# Patient Record
Sex: MALE | Race: WHITE
[De-identification: names, ages, dates, MRNs, and addresses within clinical notes are randomized per-mention and may not be internally consistent; named-entity substitution may affect disease eponyms.]

---

## 2017-01-05 ENCOUNTER — HOSPITAL ENCOUNTER (OUTPATIENT)
Dept: HOSPITAL 53 - M OUTALCOH | Age: 32
End: 2017-01-05
Attending: PSYCHIATRY & NEUROLOGY
Payer: MEDICAID

## 2017-01-05 DIAGNOSIS — Z13.9: Primary | ICD-10-CM

## 2017-01-05 DIAGNOSIS — F12.20: ICD-10-CM

## 2017-01-05 DIAGNOSIS — F14.20: ICD-10-CM

## 2017-01-06 ENCOUNTER — HOSPITAL ENCOUNTER (EMERGENCY)
Dept: HOSPITAL 53 - M ED | Age: 32
Discharge: HOME | End: 2017-01-06
Payer: MEDICAID

## 2017-01-06 DIAGNOSIS — F19.10: ICD-10-CM

## 2017-01-06 DIAGNOSIS — F41.9: Primary | ICD-10-CM

## 2017-01-06 LAB
ALBUMIN SERPL BCG-MCNC: 4.1 GM/DL (ref 3.2–5.2)
ALBUMIN/GLOB SERPL: 1.52 {RATIO} (ref 1–1.93)
ALP SERPL-CCNC: 57 U/L (ref 45–117)
ALT SERPL W P-5'-P-CCNC: 21 U/L (ref 12–78)
AMPHETAMINES UR QL SCN: NEGATIVE
ANION GAP SERPL CALC-SCNC: 8 MEQ/L (ref 8–16)
AST SERPL-CCNC: 14 U/L (ref 15–37)
BENZODIAZ UR QL SCN: NEGATIVE
BILIRUB CONJ SERPL-MCNC: 0.2 MG/DL (ref 0–0.2)
BILIRUB SERPL-MCNC: 0.8 MG/DL (ref 0.2–1)
BUN SERPL-MCNC: 14 MG/DL (ref 7–18)
BZE UR QL SCN: POSITIVE
CALCIUM SERPL-MCNC: 8.9 MG/DL (ref 8.5–10.1)
CHLORIDE SERPL-SCNC: 105 MEQ/L (ref 98–107)
CO2 SERPL-SCNC: 29 MEQ/L (ref 21–32)
CONTROL LINE: (no result)
CREAT SERPL-MCNC: 1.28 MG/DL (ref 0.7–1.3)
ERYTHROCYTE [DISTWIDTH] IN BLOOD BY AUTOMATED COUNT: 12.4 % (ref 11.5–14.5)
GFR SERPL CREATININE-BSD FRML MDRD: > 60 ML/MIN/{1.73_M2} (ref 60–?)
GLUCOSE SERPL-MCNC: 98 MG/DL (ref 70–105)
MCH RBC QN AUTO: 30.3 PG (ref 27–33)
MCHC RBC AUTO-ENTMCNC: 34.4 G/DL (ref 32–36.5)
MCV RBC AUTO: 88.1 FL (ref 80–96)
METHADONE UR QL SCN: NEGATIVE
OPIATES UR QL SCN: NEGATIVE
PLATELET # BLD AUTO: 249 K/MM3 (ref 150–450)
POTASSIUM SERPL-SCNC: 4.5 MEQ/L (ref 3.5–5.1)
PROT SERPL-MCNC: 6.8 GM/DL (ref 6.4–8.2)
SODIUM SERPL-SCNC: 142 MEQ/L (ref 136–145)
TRICYCLIC ANTIDEPRESS URINE: NEGATIVE
WBC # BLD AUTO: 4.3 K/MM3 (ref 4–10)

## 2017-01-06 PROCEDURE — 85027 COMPLETE CBC AUTOMATED: CPT

## 2017-01-06 PROCEDURE — 80076 HEPATIC FUNCTION PANEL: CPT

## 2017-01-06 PROCEDURE — 80306 DRUG TEST PRSMV INSTRMNT: CPT

## 2017-01-06 PROCEDURE — 36415 COLL VENOUS BLD VENIPUNCTURE: CPT

## 2017-01-06 PROCEDURE — 80048 BASIC METABOLIC PNL TOTAL CA: CPT

## 2017-01-06 PROCEDURE — 99284 EMERGENCY DEPT VISIT MOD MDM: CPT

## 2017-01-06 PROCEDURE — 84443 ASSAY THYROID STIM HORMONE: CPT

## 2017-01-06 NOTE — EDDOCDS
Nurse's Notes                                                                                     

[facility]                                                                         

Name: Lorenzo Kowalski                                                                                

Age: 31 yrs                                                                                       

Sex: Male                                                                                         

: 1985                                                                                   

MRN: [id number]                                                                                     

Arrival Date: 2017                                                                          

Time: 10:36                                                                                       

Account#: [id number]                                                                              

Bed [facility]                                                                                          

Private MD: No Pcp                                                                                

Diagnosis: Anxiety disorder, unspecified                                                          

                                                                                                  

Presentation:                                                                                     

                                                                                             

10:38 Presenting complaint: Patient states: states that he has a bed at [facility] for rehab    ml6 

      for crack cocaine abuse, states that he is anxious and afraid he is going to use before     

      then. Patient denies SI or HI. Presenting complaint:. Mental Health Triage Level: Level     

      1- Pt displays no suicidal or homicidal ideations and does not appear to be a danger to     

      self or others. Adult Sepsis Screening: The patient does not have new or worsening          

      altered mentation. Patient's respiratory rate is less than 22. Systolic blood pressure      

      is greater than 100. Patient has a qSOFA score of 0- Negative Sepsis Screen.                

      Suicide/Homicide risk assessment- the patient denies having any suicidal and/or             

      homicidal ideations and does not present with any other emotional, behavioral or mental     

      health complaints.  Status: Patient is not a  or              

      dependent. Transition of care: patient was not received from another setting of care.       

      Red Flag criteria, patient assessed and taken directly to a bed.                            

10:38 Acuity: PEARL Level 4                                                                     ml6 

10:38 Method Of Arrival: Walkin/Carried/Asstd                                                 ml6 

                                                                                                  

Triage Assessment:                                                                                

10:53 General: Appears in no apparent distress, Behavior is appropriate for age, cooperative. ml6 

      Pain: Denies pain. HIV screening NA for this visit Offered previously. Neurological: No     

      deficits noted. Level of Consciousness is awake, alert, Oriented to person, place,          

      time,  are equal bilaterally Moves all extremities. Full function. Cardiovascular:     

      Capillary refill < 3 seconds is brisk in bilateral fingers toes Heart tones S1 S2           

      present. Respiratory: No deficits noted. Airway is patent Respiratory effort is even,       

      unlabored, Respiratory pattern is regular, symmetrical. GI: No deficits noted.              

                                                                                                  

Historical:                                                                                       

- Allergies: no known allergies;                                                                  

- Home Meds:                                                                                      

1. amoxicillin 500 mg Oral cap 1 cap every 8 hours                                              

     (Last dose: 2017 07:00)                                                                

2. Motrin 800 mg Oral tab 1 tab 3 times per day                                                 

     (Last dose: 2017 07:00)                                                                

- PMHx: Substance Abuse;                                                                          

- PSHx: none;                                                                                     

- Social history: Smoking status: Patient states was never smoker of tobacco. Patient             

uses street drugs, marijuana, cocaine, No barriers to communication noted, Speaks               

appropriately for age.                                                                          

- Family history: Not pertinent.                                                                  

- : The pt / caregiver states he / she is not on anticoagulants. Home medication list             

is obtained from.                                                                               

- Exposure Risk Screening:: None identified.                                                      

                                                                                                  

                                                                                                  

Screenin:27 Screening information is obtained from the patient. Fall risk: No risks identified.     jo3 

      Assistance ADL's: requires no assistance with activities of daily living. Abuse/DV          

      Screen: The patient / caregiver reports he/she is: not in a situation that causes fear,     

      pain or injury. Nutritional screening: No deficits noted. Advance Directives: There is      

      no active DNR order. home support is adequate.                                              

                                                                                                  

Assessment:                                                                                       

11:31 General: Appears in no apparent distress, Behavior is anxious, cooperative, pleasant.   jo3 

      General: Pt denies SI/HI. States that he uses "a lot" of crack cocaine on a daily basis     

      and has not used for 2 days. Has a bed at treatment facility on Tuesday . Neurological:     

      Level of Consciousness is awake, alert, Oriented to person, place, time.                    

      Cardiovascular: No deficits noted. Respiratory: Airway is patent Respiratory effort is      

      even, unlabored. Derm: Skin is pink, warm & dry.                                          

12:25 Reassessment: Patient appears in no apparent distress at this time. Patient denies pain jo3 

      at this time. Security observing. Mother at bedside .                                       

13:27 General: Appears in no apparent distress, comfortable, Behavior is anxious,             jo3 

      cooperative, pleasant. General: Security observing. Neurological: No deficits noted.        

      Respiratory: No deficits noted. Airway is patent Respiratory effort is even, unlabored.     

                                                                                                  

Mental Health Eval:                                                                               

12:29 Mental health consult is initiated at 11:30.  Status: The patient is not a        

       or  dependent. [facility]: The patient is established     

      as a patient of [facility]. Referral Information: Evaluation referral is         

      generated by the patient himself / herself. The patient was referred for evaluation         

      because Pt states he is scheduled to go to rehab for methamphetamine use on Tuesday at      

      [facility]. Pt states it was recommended that he receive a "psychological evaluation" for        

      his anxiety prior to his admission. Subjective: The patients chief complaint is I           

      started having panic attacks after stopping crack. I am going to [facility] on           

      Tuesday, but I was told to come here. I'm not suicidal or homicidal, I'm just having a      

      lot of anxiety.                                                                             

12:57 Disposition: Medically cleared for disposition by Vick Steinberg MD Psychiatric Consult   ac  

      is performed by phone with Dr Gaurav Whelan.                                          

                                                                                                  

Vital Signs:                                                                                      

10:38  / 70; Pulse 56; Resp 18; Temp 98.9; Pulse Ox 100% ; Weight 81.65 kg; Height 6    elp 

      ft. 3 in. (190.50 cm); Pain 0/10;                                                           

13:52  / 88; Pulse 58; Resp 16; Temp 98.7(TE); Pulse Ox 98% on R/A;                     jo3 

10:38 Body Mass Index 22.50 (81.65 kg, 190.50 cm)                                             elp 

                                                                                                  

Vitals:                                                                                           

10:38 Log In Time: 2017 at 10:36. RN notified that patient meets Red Flag         elp 

      criteria.                                                                                   

                                                                                                  

ED Course:                                                                                        

10:37 Patient visited by Deisy Strong PCA.                                                  elp 

10:37 No Pcp is Private Physician.                                                            elp 

10:37 Patient moved to Waiting                                                                elp 

10:38 Patient visited by Deisy Strong PCA.                                                  elp 

10:40 Patient moved to [facility]                                                                   ml6 

10:44 Vick Steinberg MD is Attending Physician.                                               br1 

10:50 Triage Initiated                                                                        ml6 

10:56 Patient visited by Clifford Almanza.                                                      dpm 

11:02 Patient visited by Vick Steinberg MD.                                                   br1 

11:06 Pt greeted and oriented to ED. Patient advised of names of staff involved in care,      dpm 

      location of call bell, wait times and NPO status. Patient has correct armband on for        

      positive identification. Bed in low position. Side rails up X 1. Security observing.        

      Property left with pt per RNJl. PT mental health triage doesn't require the pt     

      to change. Psych Safety Check: Location: Psych Room. Visual Assessment: Cooperative.        

11:20 Patient visited by Clifford Almanza.                                                      dpm 

11:25 Labs drawn. (by ED staff). Sent per order to lab.                                       jo3 

11:28 Acetaminophen Level Sent.                                                               jo3 

11:28 Basic Metabolic Profile Sent.                                                           jo3 

11:28 Complete Blood Count Sent.                                                              jo3 

11:28 Drug Eval Toxicology ED Only Sent.                                                      jo3 

11:28 Ethyl Alcohol (ethanol) Sent.                                                           jo3 

11:28 Liver Profile Sent.                                                                     jo3 

11:28 Salicylate Level Sent.                                                                  jo3 

11:28 Thyroid Stimulating Hormone Sent.                                                       jo3 

11:30 Patient visited by Clifford Almanza.                                                      dpm 

11:32 Patient visited by Sinai Latif,BATSHEVA.                                                jo3 

11:34 Patient name changed from Lorenzo\S\\S\Dex\S\ to Lorenzo\S\Raimundo\S\Dex.                   
   EDMS

11:50 Patient visited by Clifford Almanza.                                                      dpm 

12:00 [facility] Payment Agreement was scanned into [employer] and attached to record.               pm4 

12:07 Patient visited by Mina Calvillo RN.                                                   ml6 

12:28 Patient visited by Clifford Almanza.                                                      dpm 

12:29 Placed in psych safe attire.  requested the pt to be changed. Property         dpm 

      removed, inventory done, secured in belongings bag- placed in locked locker. Placed in      

      locker 3.                                                                                   

12:45 Patient visited by Clifford Almanza.                                                      dpm 

13:27 The patient / caregiver is instructed regarding the plan of care and ED course.         jo3 

13:27 No IV's were initiated during this patient's visit.                                     jo3 

13:28 Patient visited by Sinai Latif RN.                                                jo3 

13:31 [facility] is Referral Physician.                                      br1 

13:52 No procedures done that require assistance.                                             jo3 

                                                                                                  

Order Results:                                                                                    

Lab Order: Acetaminophen Level; SPEC'M 17 11:24                                             

      Test: ACETAMINOPHEN LEVEL; Value: < 2.0; Range: 10.0-30.0; Abnormal: Below low normal;      

      Units: UG/ML; Status: F                                                                     

Lab Order: Basic Metabolic Profile; SPEC'M 17 11:24                                         

      Test: GLUCOSE, FASTING; Value: 98; Range: ; Units: MG/DL; Status: F                   

      Test: BLOOD UREA NITROGEN; Value: 14; Range: 7-18; Units: MG/DL; Status: F                  

      Test: CREATININE FOR GFR; Value: 1.28; Range: 0.70-1.30; Units: MG/DL; Status: F            

      Test: GLOMERULAR FILTRATION RATE; Value: > 60.0; Range: >60; Status: F                      

      Test: SODIUM LEVEL; Value: 142; Range: 136-145; Units: MEQ/L; Status: F                     

      Test: POTASSIUM SERUM; Value: 4.5; Range: 3.5-5.1; Units: MEQ/L; Status: F                  

      Test: CHLORIDE LEVEL; Value: 105; Range: ; Units: MEQ/L; Status: F                    

      Test: CARBON DIOXIDE LEVEL; Value: 29; Range: 21-32; Units: MEQ/L; Status: F                

      Test: ANION GAP; Value: 8; Range: 8-16; Units: MEQ/L; Status: F                             

      Test: CALCIUM LEVEL; Value: 8.9; Range: 8.5-10.1; Units: MG/DL; Status: F                   

      Test Note: &nbsp;; Units are mL/min/1.73 m2 Chronic Kidney Disease Staging per NKF:       

      Stage I & II GFR >=60 Normal to Mildly Decreased Stage III GFR 30-59 Moderately           

      Decreased Stage IV GFR 15-29 Severely Decreased Stage V GFR <15 Very Little GFR Left        

      ESRD GFR <15 on RRT                                                                         

Lab Order: Complete Blood Count; SPEC'M 17 11:24                                            

      Test: WHITE BLOOD COUNT; Value: 4.3; Range: 4.0-10.0; Units: K/mm3; Status: F               

      Test: RED BLOOD COUNT; Value: 5.55; Range: 4.30-6.10; Units: M/mm3; Status: F               

      Test: HEMOGLOBIN; Value: 16.8; Range: 14.0-18.0; Units: g/dl; Status: F                     

      Test: HEMATOCRIT; Value: 48.9; Range: 42.0-52.0; Units: %; Status: F                        

      Test: MEAN CORPUSCULAR VOLUME; Value: 88.1; Range: 80.0-96.0; Units: fl; Status: F          

      Test: MEAN CORPUSCULAR HEMOGLOBIN; Value: 30.3; Range: 27.0-33.0; Units: pg; Status: F      

      Test: MEAN CORPUSCULAR HGB CONC; Value: 34.4; Range: 32.0-36.5; Units: g/dl; Status: F      

      Test: RED CELL DISTRIBUTION WIDTH; Value: 12.4; Range: 11.5-14.5; Units: %; Status: F       

      Test: PLATELET COUNT, AUTOMATED; Value: 249; Range: 150-450; Units: k/mm3; Status: F        

Lab Order: Drug Eval Toxicology ED Only; SPEC'M 17 11:24                                    

      Test: AMPHETAMINES LEVEL URINE; Value: NEGATIVE; Range: NEGATIVE; Status: F                 

      Test: BARBITURATES URINE; Value: NEGATIVE; Range: NEGATIVE; Status: F                       

      Test: BENZODIAZEPINES URINE; Value: NEGATIVE; Range: NEGATIVE; Status: F                    

      Test: CANNABINOIDS URINE; Value: POSITIVE; Range: NEGATIVE; Abnormal: Above high            

      normal; Status: F                                                                           

      Test: COCAINE METABOLITE URINE; Value: POSITIVE; Range: NEGATIVE; Abnormal: Above high      

      normal; Status: F                                                                           

      Test: METHADONE URINE; Value: NEGATIVE; Range: NEGATIVE; Status: F                          

      Test: OPIATES URINE; Value: NEGATIVE; Range: NEGATIVE; Status: F                            

      Test: TRICYCLIC ANTIDEPRESS URINE; Value: NEGATIVE; Range: NEGATIVE; Status: F              

      Test Note: &nbsp;; FALSE POSITIVE RESULTS CAN BE CAUSED BY THE USE OF PANTOPRAZOLE        

      (PROTONIX).                                                                                 

Lab Order: Ethyl Alcohol (ethanol); SPEC'M 17 11:24                                         

      Test: ETHYL ALCOHOL (ETHANOL); Value: 0.004; Range: 0.000-0.010; Units: %; Status: F        

Lab Order: Liver Profile; SPEC'M 17 11:24                                                   

      Test: AST/SGOT; Value: 14; Range: 15-37; Abnormal: Below low normal; Units: U/L;            

      Status: F                                                                                   

      Test: ALT/SGPT; Value: 21; Range: 12-78; Units: U/L; Status: F                              

      Test: ALKALINE PHOSPHATASE; Value: 57; Range: ; Units: U/L; Status: F                 

      Test: BILIRUBIN,TOTAL; Value: 0.8; Range: 0.2-1.0; Units: MG/DL; Status: F                  

      Test: BILIRUBIN,DIRECT; Value: 0.2; Range: 0.0-0.2; Units: MG/DL; Status: F                 

      Test: TOTAL PROTEIN; Value: 6.8; Range: 6.4-8.2; Units: GM/DL; Status: F                    

      Test: ALBUMIN; Value: 4.1; Range: 3.2-5.2; Units: GM/DL; Status: F                          

      Test: ALBUMIN/GLOBULIN RATIO; Value: 1.52; Range: 1.00-1.93; Status: F                      

Lab Order: Salicylate Level; SPEC'M 17 11:24                                                

      Test: SALICYLATE LEVEL; Value: < 1.7; Range: 5.0-30.0; Abnormal: Below low normal;          

      Units: MG/DL; Status: F                                                                     

Lab Order: Thyroid Stimulating Hormone; SPEC'M 17 11:24                                     

      Test: THYROID STIMULATING HORMONE; Value: 0.736; Range: 0.358-3.740; Units: uIU/ML;         

      Status: F                                                                                   

                                                                                                  

Outcome:                                                                                          

13:32 Discharge ordered by Provider.                                                          br1 

13:52 Discharge Assessment: Patient awake, alert and oriented x 3. No cognitive and/or        jo3 

      functional deficits noted. Patient verbalized understanding of disposition                  

      instructions. patient administered narcotics - no. The following High Risk Discharge        

      criteria are identified: None. Discharged to home ambulatory, with parent. Condition:       

      stable. No special radiology studies were completed.                                        

13:57 Patient left the ED.                                                                    jo3 

                                                                                                  

Signatures:                                                                                       

Dispatcher [employer]                           EDMS                                                 

Denzel Morris, PSA                       PSA  ac                                                   

Sinai Latif RN                    RN   jo3                                                  

Vick Steinberg MD MD   br1                                                  

Mina Calvillo RN                       RN   ml6                                                  

Clifford Almanza                               dpDeisy Sadler, KENNETH                      PCA  elTom Loera, Reg                     Reg  pm4                                                  

                                                                                                  

Corrections: (The following items were deleted from the chart)                                    

13:34 12:29 Subjective: The patients chief complaint is I started having panic attacks after  ac  

      stopping meth. I am going to [facility] on Tuesday, but I was told to come here. I'm     

      not suicidal or homicidal, I'm just having a lot of anxiety. ac                             

                                                                                                  

**************************************************************************************************

MTDD

## 2017-01-06 NOTE — EDDOCDS
Physician Documentation                                                                           

[facility]                                                                         

Name: Lorenzo Kowalski                                                                                

Age: 31 yrs                                                                                       

Sex: Male                                                                                         

: 1985                                                                                   

MRN: [id number]                                                                                     

Arrival Date: 2017                                                                          

Time: 10:36                                                                                       

Account#: [id number]                                                                              

Bed U3                                                                                          

Private MD: No Pcp                                                                                

Disposition:                                                                                      

17 13:32 Discharged to Home/Self Care. Impression: Anxiety disorder, unspecified.           

- Condition is Stable.                                                                            

- Discharge Instructions: Depression, Adult, Generalized Anxiety Disorder.                        

                                                                                                  

- Medication Reconciliation, Local Pharmacy Hours form.                                           

- Follow up: [facility]; When: 4 - 5 days; Reason: Recheck today's               

complaints.                                                                                     

- Problem is new.                                                                                 

- Symptoms have improved.                                                                         

- Notes: You were seen in the ED for a mental health evaluation with concerns for                 

anxiety. Bloodwork showed no acute findings, drug screen was positive for cocaine and           

marijuana. Psychiatry was consulted and recommended you may return home to be seen              

for outpatient follow-up. Please call as directed by your psychosocial assesor and              

arrange to be seen. Return to the ED for any worsening depression, thoughts of                  

harming self or others or any other concerns.                                                   

                                                                                                  

                                                                                                  

Historical:                                                                                       

- Allergies: no known allergies;                                                                  

- Home Meds:                                                                                      

1. amoxicillin 500 mg Oral cap 1 cap every 8 hours                                              

     (Last dose: 2017 07:00)                                                                

2. Motrin 800 mg Oral tab 1 tab 3 times per day                                                 

     (Last dose: 2017 07:00)                                                                

- PMHx: Substance Abuse;                                                                          

- PSHx: none;                                                                                     

- Social history: Smoking status: Patient states was never smoker of tobacco. Patient             

uses street drugs, marijuana, cocaine, No barriers to communication noted, Speaks               

appropriately for age.                                                                          

- Family history: Not pertinent.                                                                  

- : The pt / caregiver states he / she is not on anticoagulants. Home medication list             

is obtained from.                                                                               

- Exposure Risk Screening:: None identified.                                                      

                                                                                                  

                                                                                                  

Vital Signs:                                                                                      

                                                                                             

10:38  / 70; Pulse 56; Resp 18; Temp 98.9; Pulse Ox 100% ; Weight 81.65 kg / 180.01     elp 

      lbs; Height 6 ft. 3 in. (190.50 cm); Pain 0/10;                                             

13:52  / 88; Pulse 58; Resp 16; Temp 98.7(TE); Pulse Ox 98% on R/A;                     jo3 

10:38 Body Mass Index 22.50 (81.65 kg, 190.50 cm)                                             elp 

                                                                                                  

MDM:                                                                                              

11:03 Consult PFS/PSA/ ordered.                                                  br1 

11:03 Consult PFS/PSA/: Patient's case requires discussion with on-call          br1 

      Psychiatrist ordered.                                                                       

11:03 PSA/PFS to call Nursing Supervisor, to enter patient data on [place] Safe Act if patient    br1 

      involuntarily admitted or transferred for SI or HI ordered.                                 

11:03 Confirm accurate psychiatric medication list and times of last dosage ordered.          br1 

11:03 Detain Pt Until Medically/PFS Cleared ordered.                                          br1 

11:04 Acetaminophen Level Ordered.                                                            EDMS

11:04 Basic Metabolic Profile Ordered.                                                        EDMS

11:04 Complete Blood Count Ordered.                                                           EDMS

11:04 Drug Eval Toxicology ED Only Ordered.                                                   EDMS

11:04 Ethyl Alcohol (ethanol) Ordered.                                                        EDMS

11:04 Liver Profile Ordered.                                                                  EDMS

11:04 Salicylate Level Ordered.                                                               EDMS

11:04 Thyroid Stimulating Hormone Ordered.                                                    EDMS

11:23 Financial registration complete.                                                        pm4 

11:30 REGULAR DIET PLASTIC WARE+DIET ordered.                                                 EDMS

12:00 [facility] Payment Agreement was scanned into [employer] and attached to record.               pm4 

13:28 Acetaminophen Level Reviewed.                                                           br1 

13:28 Drug Eval Toxicology ED Only Reviewed.                                                  br1 

13:28 Liver Profile Reviewed.                                                                 br1 

13:28 Salicylate Level Reviewed.                                                              br1 

13:28 Basic Metabolic Profile Reviewed.                                                       br1 

13:28 Complete Blood Count Reviewed.                                                          br1 

13:28 Ethyl Alcohol (ethanol) Reviewed.                                                       br1 

13:28 Thyroid Stimulating Hormone Reviewed.                                                   br1 

13:29 Consult PFS/PSA/Socail Worker: Cleared medically for eval ordered.                      br1 

                                                                                                  

Signatures:                                                                                       

Dispatcher [employer]                           EDMS                                                 

Sinai LatifRN                    RN   jo3                                                  

Vick Steinberg MD MD   br1                                                  

Mina Calvillo RN                       RN   ml6                                                  

Tom Louis, Reg                     Reg  pm4                                                  

                                                                                                  

The chart was reviewed and I authenticate all verbal orders and agree with the evaluation and 
treatment provided.Attachments:

12:00 [facility] Payment Agreement                                                                pm4 

                                                                                                  

**************************************************************************************************

MTDD

## 2017-01-08 NOTE — EDDOCDS
Nurse's Notes                                                                                     

[facility]                                                                         

Name: Lorenzo Kowalski                                                                                

Age: 31 yrs                                                                                       

Sex: Male                                                                                         

: 1985                                                                                   

MRN: [id number]                                                                                     

Arrival Date: 2017                                                                          

Time: 10:36                                                                                       

Account#: [id number]                                                                              

Bed [facility]                                                                                          

Private MD: No Pcp                                                                                

Diagnosis: Anxiety disorder, unspecified                                                          

                                                                                                  

Presentation:                                                                                     

                                                                                             

10:38 Presenting complaint: Patient states: states that he has a bed at [facility] for rehab    ml6 

      for crack cocaine abuse, states that he is anxious and afraid he is going to use before     

      then. Patient denies SI or HI. Presenting complaint:. Mental Health Triage Level: Level     

      1- Pt displays no suicidal or homicidal ideations and does not appear to be a danger to     

      self or others. Adult Sepsis Screening: The patient does not have new or worsening          

      altered mentation. Patient's respiratory rate is less than 22. Systolic blood pressure      

      is greater than 100. Patient has a qSOFA score of 0- Negative Sepsis Screen.                

      Suicide/Homicide risk assessment- the patient denies having any suicidal and/or             

      homicidal ideations and does not present with any other emotional, behavioral or mental     

      health complaints.  Status: Patient is not a  or              

      dependent. Transition of care: patient was not received from another setting of care.       

      Red Flag criteria, patient assessed and taken directly to a bed.                            

10:38 Acuity: PEARL Level 4                                                                     ml6 

10:38 Method Of Arrival: Walkin/Carried/Asstd                                                 ml6 

                                                                                                  

Triage Assessment:                                                                                

10:53 General: Appears in no apparent distress, Behavior is appropriate for age, cooperative. ml6 

      Pain: Denies pain. HIV screening NA for this visit Offered previously. Neurological: No     

      deficits noted. Level of Consciousness is awake, alert, Oriented to person, place,          

      time,  are equal bilaterally Moves all extremities. Full function. Cardiovascular:     

      Capillary refill < 3 seconds is brisk in bilateral fingers toes Heart tones S1 S2           

      present. Respiratory: No deficits noted. Airway is patent Respiratory effort is even,       

      unlabored, Respiratory pattern is regular, symmetrical. GI: No deficits noted.              

                                                                                                  

Historical:                                                                                       

- Allergies: no known allergies;                                                                  

- Home Meds:                                                                                      

1. amoxicillin 500 mg Oral cap 1 cap every 8 hours                                              

     (Last dose: 2017 07:00)                                                                

2. Motrin 800 mg Oral tab 1 tab 3 times per day                                                 

     (Last dose: 2017 07:00)                                                                

- PMHx: Substance Abuse;                                                                          

- PSHx: none;                                                                                     

- Social history: Smoking status: Patient states was never smoker of tobacco. Patient             

uses street drugs, marijuana, cocaine, No barriers to communication noted, Speaks               

appropriately for age.                                                                          

- Family history: Not pertinent.                                                                  

- : The pt / caregiver states he / she is not on anticoagulants. Home medication list             

is obtained from.                                                                               

- Exposure Risk Screening:: None identified.                                                      

                                                                                                  

                                                                                                  

Screenin:27 Screening information is obtained from the patient. Fall risk: No risks identified.     jo3 

      Assistance ADL's: requires no assistance with activities of daily living. Abuse/DV          

      Screen: The patient / caregiver reports he/she is: not in a situation that causes fear,     

      pain or injury. Nutritional screening: No deficits noted. Advance Directives: There is      

      no active DNR order. home support is adequate.                                              

                                                                                                  

Assessment:                                                                                       

11:31 General: Appears in no apparent distress, Behavior is anxious, cooperative, pleasant.   jo3 

      General: Pt denies SI/HI. States that he uses "a lot" of crack cocaine on a daily basis     

      and has not used for 2 days. Has a bed at treatment facility on Tuesday . Neurological:     

      Level of Consciousness is awake, alert, Oriented to person, place, time.                    

      Cardiovascular: No deficits noted. Respiratory: Airway is patent Respiratory effort is      

      even, unlabored. Derm: Skin is pink, warm & dry.                                          

12:25 Reassessment: Patient appears in no apparent distress at this time. Patient denies pain jo3 

      at this time. Security observing. Mother at bedside .                                       

13:27 General: Appears in no apparent distress, comfortable, Behavior is anxious,             jo3 

      cooperative, pleasant. General: Security observing. Neurological: No deficits noted.        

      Respiratory: No deficits noted. Airway is patent Respiratory effort is even, unlabored.     

                                                                                                  

Mental Health Eval:                                                                               

12:29 Mental health consult is initiated at 11:30.  Status: The patient is not a        

       or  dependent. [facility]: The patient is established     

      as a patient of [facility]. Referral Information: Evaluation referral is         

      generated by the patient himself / herself. The patient was referred for evaluation         

      because Pt states he is scheduled to go to rehab for methamphetamine use on Tuesday at      

      [facility]. Pt states it was recommended that he receive a "psychological evaluation" for        

      his anxiety prior to his admission. Subjective: The patients chief complaint is I           

      started having panic attacks after stopping crack. I am going to [facility] on           

      Tuesday, but I was told to come here. I'm not suicidal or homicidal, I'm just having a      

      lot of anxiety.                                                                             

12:57 Disposition: Medically cleared for disposition by Vcik Steinberg MD Psychiatric Consult   ac  

      is performed by phone with Dr Gaurav Whelan.                                          

14:20 Mental Health history: no relevant mental health problems or treatments. Mental Health  ac  

      Admissions: None. Current Outpatient Mental Health Services: None. Current living           

      environment is The patient currently lives with his / her father, . Patient presents to     

      Emergency Department with the following symptoms within the past 2 weeks: anxiety, drug     

      abuse. Substance abuse: Patient uses cocaine, Patient uses marijuana. Mental status         

      exam: Patients appearance is appropriate, Patient's behavior is cooperative, Speech is      

      normal. Affect is appropriate. Mood is anxious. Hallucinations are denied. Appetite is      

      normal. Memory is good. Energy level is normal. Content of thought is normal. Thought       

      process is intact. Cognitive level is oriented to person, place, time and situation         

      Patient's insight is fair. Judgement is fair. Rapport with interviewer is good.             

      Suicidal Ideation is not present. Homicidal ideation is not present. [facility] Admission         

      Criteria: Not Applicable. DSM-V Differential Diagnosis: Social Communication Disorder.      

      Stimulant Use Disorder, Pt uses cocaine.                                                    

                                                                                                  

Vital Signs:                                                                                      

10:38  / 70; Pulse 56; Resp 18; Temp 98.9; Pulse Ox 100% ; Weight 81.65 kg; Height 6    elp 

      ft. 3 in. (190.50 cm); Pain 0/10;                                                           

13:52  / 88; Pulse 58; Resp 16; Temp 98.7(TE); Pulse Ox 98% on R/A;                     jo3 

10:38 Body Mass Index 22.50 (81.65 kg, 190.50 cm)                                             elp 

                                                                                                  

Vitals:                                                                                           

10:38 Log In Time: 2017 at 10:36. RN notified that patient meets Red Flag         elp 

      criteria.                                                                                   

                                                                                                  

ED Course:                                                                                        

10:37 Patient visited by Deisy Strong PCA.                                                  elp 

10:37 No Pcp is Private Physician.                                                            elp 

10:37 Patient moved to Waiting                                                                elp 

10:38 Patient visited by Deisy Strong PCA.                                                  elp 

10:40 Patient moved to [facility]                                                                   ml6 

10:44 Vick Steinberg MD is Attending Physician.                                               br1 

10:50 Triage Initiated                                                                        ml6 

10:56 Patient visited by Clifford Almanza.                                                      dpm 

11:02 Patient visited by Vick Steinberg MD.                                                   br1 

11:06 Pt greeted and oriented to ED. Patient advised of names of staff involved in care,      dpm 

      location of call bell, wait times and NPO status. Patient has correct armband on for        

      positive identification. Bed in low position. Side rails up X 1. Security observing.        

      Property left with pt per RN, Jl Calvillo. PT mental health triage doesn't require the pt     

      to change. Psych Safety Check: Location: Psych Room. Visual Assessment: Cooperative.        

11:20 Patient visited by Clifford Almanza.                                                      dpm 

11:25 Labs drawn. (by ED staff). Sent per order to lab.                                       jo3 

11:28 Acetaminophen Level Sent.                                                               jo3 

11:28 Basic Metabolic Profile Sent.                                                           jo3 

11:28 Complete Blood Count Sent.                                                              jo3 

11:28 Drug Eval Toxicology ED Only Sent.                                                      jo3 

11:28 Ethyl Alcohol (ethanol) Sent.                                                           jo3 

11:28 Liver Profile Sent.                                                                     jo3 

11:28 Salicylate Level Sent.                                                                  jo3 

11:28 Thyroid Stimulating Hormone Sent.                                                       jo3 

11:30 Patient visited by Clifford Almanza.                                                      dpm 

11:32 Patient visited by Sinai Latif RN.                                                jo3 

11:34 Patient name changed from Lorenzo\S\\S\Cook\S\ to Lorenzo\S\Raimundo\S\Dex.                   
   EDMS

11:50 Patient visited by Clifford Almanza.                                                      dpm 

12:00 [facility] Payment Agreement was scanned into [employer] and attached to record.               pm4 

12:07 Patient visited by Mina Calvillo RN.                                                   ml6 

12:28 Patient visited by Clifford Almanza.                                                      dpm 

12:29 Placed in psych safe attire.  requested the pt to be changed. Property         dpm 

      removed, inventory done, secured in belongings bag- placed in locked locker. Placed in      

      locker 3.                                                                                   

12:45 Patient visited by Clifford Almanza.                                                      dpm 

13:27 The patient / caregiver is instructed regarding the plan of care and ED course.         jo3 

13:27 No IV's were initiated during this patient's visit.                                     jo3 

13:28 Patient visited by Sinai Latif RN.                                                jo3 

13:31 [facility] is Referral Physician.                                      br1 

13:52 No procedures done that require assistance.                                             jo3 

                                                                                             

09:08 T-Sheet-- Draft Copy was scanned into [employer] and attached to record.                   gb  

                                                                                                  

Order Results:                                                                                    

Lab Order: Acetaminophen Level; SPEC'M 17 11:24                                             

      Test: ACETAMINOPHEN LEVEL; Value: < 2.0; Range: 10.0-30.0; Abnormal: Below low normal;      

      Units: UG/ML; Status: F                                                                     

Lab Order: Basic Metabolic Profile; SPEC'M 17 11:24                                         

      Test: GLUCOSE, FASTING; Value: 98; Range: ; Units: MG/DL; Status: F                   

      Test: BLOOD UREA NITROGEN; Value: 14; Range: 7-18; Units: MG/DL; Status: F                  

      Test: CREATININE FOR GFR; Value: 1.28; Range: 0.70-1.30; Units: MG/DL; Status: F            

      Test: GLOMERULAR FILTRATION RATE; Value: > 60.0; Range: >60; Status: F                      

      Test: SODIUM LEVEL; Value: 142; Range: 136-145; Units: MEQ/L; Status: F                     

      Test: POTASSIUM SERUM; Value: 4.5; Range: 3.5-5.1; Units: MEQ/L; Status: F                  

      Test: CHLORIDE LEVEL; Value: 105; Range: ; Units: MEQ/L; Status: F                    

      Test: CARBON DIOXIDE LEVEL; Value: 29; Range: 21-32; Units: MEQ/L; Status: F                

      Test: ANION GAP; Value: 8; Range: 8-16; Units: MEQ/L; Status: F                             

      Test: CALCIUM LEVEL; Value: 8.9; Range: 8.5-10.1; Units: MG/DL; Status: F                   

      Test Note: &nbsp;; Units are mL/min/1.73 m2 Chronic Kidney Disease Staging per NKF:       

      Stage I & II GFR >=60 Normal to Mildly Decreased Stage III GFR 30-59 Moderately           

      Decreased Stage IV GFR 15-29 Severely Decreased Stage V GFR <15 Very Little GFR Left        

      ESRD GFR <15 on RRT                                                                         

Lab Order: Complete Blood Count; SPEC'M 17 11:24                                            

      Test: WHITE BLOOD COUNT; Value: 4.3; Range: 4.0-10.0; Units: K/mm3; Status: F               

      Test: RED BLOOD COUNT; Value: 5.55; Range: 4.30-6.10; Units: M/mm3; Status: F               

      Test: HEMOGLOBIN; Value: 16.8; Range: 14.0-18.0; Units: g/dl; Status: F                     

      Test: HEMATOCRIT; Value: 48.9; Range: 42.0-52.0; Units: %; Status: F                        

      Test: MEAN CORPUSCULAR VOLUME; Value: 88.1; Range: 80.0-96.0; Units: fl; Status: F          

      Test: MEAN CORPUSCULAR HEMOGLOBIN; Value: 30.3; Range: 27.0-33.0; Units: pg; Status: F      

      Test: MEAN CORPUSCULAR HGB CONC; Value: 34.4; Range: 32.0-36.5; Units: g/dl; Status: F      

      Test: RED CELL DISTRIBUTION WIDTH; Value: 12.4; Range: 11.5-14.5; Units: %; Status: F       

      Test: PLATELET COUNT, AUTOMATED; Value: 249; Range: 150-450; Units: k/mm3; Status: F        

Lab Order: Drug Eval Toxicology ED Only; SPEC'M 17 11:24                                    

      Test: AMPHETAMINES LEVEL URINE; Value: NEGATIVE; Range: NEGATIVE; Status: F                 

      Test: BARBITURATES URINE; Value: NEGATIVE; Range: NEGATIVE; Status: F                       

      Test: BENZODIAZEPINES URINE; Value: NEGATIVE; Range: NEGATIVE; Status: F                    

      Test: CANNABINOIDS URINE; Value: POSITIVE; Range: NEGATIVE; Abnormal: Above high            

      normal; Status: F                                                                           

      Test: COCAINE METABOLITE URINE; Value: POSITIVE; Range: NEGATIVE; Abnormal: Above high      

      normal; Status: F                                                                           

      Test: METHADONE URINE; Value: NEGATIVE; Range: NEGATIVE; Status: F                          

      Test: OPIATES URINE; Value: NEGATIVE; Range: NEGATIVE; Status: F                            

      Test: TRICYCLIC ANTIDEPRESS URINE; Value: NEGATIVE; Range: NEGATIVE; Status: F              

      Test Note: &nbsp;; FALSE POSITIVE RESULTS CAN BE CAUSED BY THE USE OF PANTOPRAZOLE        

      (PROTONIX).                                                                                 

Lab Order: Ethyl Alcohol (ethanol); SPEC'M 17 11:24                                         

      Test: ETHYL ALCOHOL (ETHANOL); Value: 0.004; Range: 0.000-0.010; Units: %; Status: F        

Lab Order: Liver Profile; SPEC'M 17 11:24                                                   

      Test: AST/SGOT; Value: 14; Range: 15-37; Abnormal: Below low normal; Units: U/L;            

      Status: F                                                                                   

      Test: ALT/SGPT; Value: 21; Range: 12-78; Units: U/L; Status: F                              

      Test: ALKALINE PHOSPHATASE; Value: 57; Range: ; Units: U/L; Status: F                 

      Test: BILIRUBIN,TOTAL; Value: 0.8; Range: 0.2-1.0; Units: MG/DL; Status: F                  

      Test: BILIRUBIN,DIRECT; Value: 0.2; Range: 0.0-0.2; Units: MG/DL; Status: F                 

      Test: TOTAL PROTEIN; Value: 6.8; Range: 6.4-8.2; Units: GM/DL; Status: F                    

      Test: ALBUMIN; Value: 4.1; Range: 3.2-5.2; Units: GM/DL; Status: F                          

      Test: ALBUMIN/GLOBULIN RATIO; Value: 1.52; Range: 1.00-1.93; Status: F                      

Lab Order: Salicylate Level; SPEC'M 17 11:24                                                

      Test: SALICYLATE LEVEL; Value: < 1.7; Range: 5.0-30.0; Abnormal: Below low normal;          

      Units: MG/DL; Status: F                                                                     

Lab Order: Thyroid Stimulating Hormone; SPEC'M 17 11:24                                     

      Test: THYROID STIMULATING HORMONE; Value: 0.736; Range: 0.358-3.740; Units: uIU/ML;         

      Status: F                                                                                   

                                                                                                  

Outcome:                                                                                          

                                                                                             

13:32 Discharge ordered by Provider.                                                          br1 

13:52 Discharge Assessment: Patient awake, alert and oriented x 3. No cognitive and/or        jo3 

      functional deficits noted. Patient verbalized understanding of disposition                  

      instructions. patient administered narcotics - no. The following High Risk Discharge        

      criteria are identified: None. Discharged to home ambulatory, with parent. Condition:       

      stable. No special radiology studies were completed.                                        

13:57 Patient left the ED.                                                                    jo3 

                                                                                                  

Signatures:                                                                                       

Dispatcher [employer]                           EDMS                                                 

Denzel Morris, CHERYL                       PSA  Marysol Jeong, Sinai Dietz RN                    RN   jo3                                                  

Vick Steinberg MD MD   br1                                                  

Mina Calvillo RN                       RN   ml6                                                  

Clifford Almanza                               dpm                                                  

Deisy Strong, PCA                      PCA  elp                                                  

Tom Louis, Reg                     Reg  pm4                                                  

                                                                                                  

Corrections: (The following items were deleted from the chart)                                    

13:34 12:29 Subjective: The patients chief complaint is I started having panic attacks after  ac  

      stopping meth. I am going to [facility] on Tuesday, but I was told to come here. I'm     

      not suicidal or homicidal, I'm just having a lot of anxiety. ac                             

                                                                                                  

**************************************************************************************************



*** Chart Complete ***
MTDD

## 2017-01-08 NOTE — EDDOCDS
Physician Documentation                                                                           

[facility]                                                                         

Name: Lorenzo Kowalski                                                                                

Age: 31 yrs                                                                                       

Sex: Male                                                                                         

: 1985                                                                                   

MRN: [id number]                                                                                     

Arrival Date: 2017                                                                          

Time: 10:36                                                                                       

Account#: [id number]                                                                              

Bed U3                                                                                          

Private MD: No Pcp                                                                                

Disposition:                                                                                      

17 13:32 Discharged to Home/Self Care. Impression: Anxiety disorder, unspecified.           

- Condition is Stable.                                                                            

- Discharge Instructions: Depression, Adult, Generalized Anxiety Disorder.                        

                                                                                                  

- Medication Reconciliation, Local Pharmacy Hours form.                                           

- Follow up: [facility]; When: 4 - 5 days; Reason: Recheck today's               

complaints.                                                                                     

- Problem is new.                                                                                 

- Symptoms have improved.                                                                         

- Notes: You were seen in the ED for a mental health evaluation with concerns for                 

anxiety. Bloodwork showed no acute findings, drug screen was positive for cocaine and           

marijuana. Psychiatry was consulted and recommended you may return home to be seen              

for outpatient follow-up. Please call as directed by your psychosocial assesor and              

arrange to be seen. Return to the ED for any worsening depression, thoughts of                  

harming self or others or any other concerns.                                                   

                                                                                                  

                                                                                                  

Historical:                                                                                       

- Allergies: no known allergies;                                                                  

- Home Meds:                                                                                      

1. amoxicillin 500 mg Oral cap 1 cap every 8 hours                                              

     (Last dose: 2017 07:00)                                                                

2. Motrin 800 mg Oral tab 1 tab 3 times per day                                                 

     (Last dose: 2017 07:00)                                                                

- PMHx: Substance Abuse;                                                                          

- PSHx: none;                                                                                     

- Social history: Smoking status: Patient states was never smoker of tobacco. Patient             

uses street drugs, marijuana, cocaine, No barriers to communication noted, Speaks               

appropriately for age.                                                                          

- Family history: Not pertinent.                                                                  

- : The pt / caregiver states he / she is not on anticoagulants. Home medication list             

is obtained from.                                                                               

- Exposure Risk Screening:: None identified.                                                      

                                                                                                  

                                                                                                  

Vital Signs:                                                                                      

                                                                                             

10:38  / 70; Pulse 56; Resp 18; Temp 98.9; Pulse Ox 100% ; Weight 81.65 kg / 180.01     elp 

      lbs; Height 6 ft. 3 in. (190.50 cm); Pain 0/10;                                             

13:52  / 88; Pulse 58; Resp 16; Temp 98.7(TE); Pulse Ox 98% on R/A;                     jo3 

10:38 Body Mass Index 22.50 (81.65 kg, 190.50 cm)                                             elp 

                                                                                                  

MDM:                                                                                              

11:03 Consult PFS/PSA/ ordered.                                                  br1 

11:03 Consult PFS/PSA/: Patient's case requires discussion with on-call          br1 

      Psychiatrist ordered.                                                                       

11:03 PSA/PFS to call Nursing Supervisor, to enter patient data on [place] Safe Act if patient    br1 

      involuntarily admitted or transferred for SI or HI ordered.                                 

11:03 Confirm accurate psychiatric medication list and times of last dosage ordered.          br1 

11:03 Detain Pt Until Medically/PFS Cleared ordered.                                          br1 

11:04 Acetaminophen Level Ordered.                                                            EDMS

11:04 Basic Metabolic Profile Ordered.                                                        EDMS

11:04 Complete Blood Count Ordered.                                                           EDMS

11:04 Drug Eval Toxicology ED Only Ordered.                                                   EDMS

11:04 Ethyl Alcohol (ethanol) Ordered.                                                        EDMS

11:04 Liver Profile Ordered.                                                                  EDMS

11:04 Salicylate Level Ordered.                                                               EDMS

11:04 Thyroid Stimulating Hormone Ordered.                                                    EDMS

11:23 Financial registration complete.                                                        pm4 

11:30 REGULAR DIET PLASTIC WARE+DIET ordered.                                                 EDMS

12:00 [facility] Payment Agreement was scanned into [employer] and attached to record.               pm4 

13:28 Acetaminophen Level Reviewed.                                                           br1 

13:28 Drug Eval Toxicology ED Only Reviewed.                                                  br1 

13:28 Liver Profile Reviewed.                                                                 br1 

13:28 Salicylate Level Reviewed.                                                              br1 

13:28 Basic Metabolic Profile Reviewed.                                                       br1 

13:28 Complete Blood Count Reviewed.                                                          br1 

13:28 Ethyl Alcohol (ethanol) Reviewed.                                                       br1 

13:28 Thyroid Stimulating Hormone Reviewed.                                                   br1 

13:29 Consult PFS/PSA/Socail Worker: Cleared medically for eval ordered.                      br1 

                                                                                             

09:08 T-Sheet-- Draft Copy was scanned into [employer] and attached to record.                   gb  

                                                                                                  

Signatures:                                                                                       

Dispatcher [employer]                           EDMS                                                 

Marysol Maxwell, Reg                  Reg  gb                                                   

Sinai Latif,RN                    RN   jo3                                                  

Vick Steinberg MD MD   br1                                                  

Mina Calvillo, RN                       RN   ml6                                                  

Tom Louis, Reg                     Reg  pm4                                                  

                                                                                                  

The chart was reviewed and I authenticate all verbal orders and agree with the evaluation and 
treatment provided.Attachments:

                                                                                             

12:00 [facility] Payment Agreement                                                                pm4 

                                                                                             

09:08 T-Sheet-- Draft Copy                                                                    gb  

                                                                                                  

**************************************************************************************************



*** Chart Complete ***
MTDD

## 2017-01-08 NOTE — EDDOCDS
Physician Documentation                                                                           

[facility]                                                                         

Name: Lorenzo Kowalski                                                                                

Age: 31 yrs                                                                                       

Sex: Male                                                                                         

: 1985                                                                                   

MRN: [id number]                                                                                     

Arrival Date: 2017                                                                          

Time: 10:36                                                                                       

Account#: [id number]                                                                              

Bed U3                                                                                          

Private MD: No Pcp                                                                                

Disposition:                                                                                      

17 13:32 Discharged to Home/Self Care. Impression: Anxiety disorder, unspecified.           

- Condition is Stable.                                                                            

- Discharge Instructions: Depression, Adult, Generalized Anxiety Disorder.                        

                                                                                                  

- Medication Reconciliation, Local Pharmacy Hours form.                                           

- Follow up: [facility]; When: 4 - 5 days; Reason: Recheck today's               

complaints.                                                                                     

- Problem is new.                                                                                 

- Symptoms have improved.                                                                         

- Notes: You were seen in the ED for a mental health evaluation with concerns for                 

anxiety. Bloodwork showed no acute findings, drug screen was positive for cocaine and           

marijuana. Psychiatry was consulted and recommended you may return home to be seen              

for outpatient follow-up. Please call as directed by your psychosocial assesor and              

arrange to be seen. Return to the ED for any worsening depression, thoughts of                  

harming self or others or any other concerns.                                                   

                                                                                                  

                                                                                                  

Historical:                                                                                       

- Allergies: no known allergies;                                                                  

- Home Meds:                                                                                      

1. amoxicillin 500 mg Oral cap 1 cap every 8 hours                                              

     (Last dose: 2017 07:00)                                                                

2. Motrin 800 mg Oral tab 1 tab 3 times per day                                                 

     (Last dose: 2017 07:00)                                                                

- PMHx: Substance Abuse;                                                                          

- PSHx: none;                                                                                     

- Social history: Smoking status: Patient states was never smoker of tobacco. Patient             

uses street drugs, marijuana, cocaine, No barriers to communication noted, Speaks               

appropriately for age.                                                                          

- Family history: Not pertinent.                                                                  

- : The pt / caregiver states he / she is not on anticoagulants. Home medication list             

is obtained from.                                                                               

- Exposure Risk Screening:: None identified.                                                      

                                                                                                  

                                                                                                  

Vital Signs:                                                                                      

                                                                                             

10:38  / 70; Pulse 56; Resp 18; Temp 98.9; Pulse Ox 100% ; Weight 81.65 kg / 180.01     elp 

      lbs; Height 6 ft. 3 in. (190.50 cm); Pain 0/10;                                             

13:52  / 88; Pulse 58; Resp 16; Temp 98.7(TE); Pulse Ox 98% on R/A;                     jo3 

10:38 Body Mass Index 22.50 (81.65 kg, 190.50 cm)                                             elp 

                                                                                                  

MDM:                                                                                              

11:03 Consult PFS/PSA/ ordered.                                                  br1 

11:03 Consult PFS/PSA/: Patient's case requires discussion with on-call          br1 

      Psychiatrist ordered.                                                                       

11:03 PSA/PFS to call Nursing Supervisor, to [place] Safe [place] if patient    br1 

      involuntarily admitted or transferred for SI or HI ordered.                                 

11:03 Confirm accurate psychiatric medication list and times of last dosage ordered.          br1 

11:03 Detain Pt Until Medically/PFS Cleared ordered.                                          br1 

11:04 Acetaminophen Level Ordered.                                                            EDMS

11:04 Basic Metabolic Profile Ordered.                                                        EDMS

11:04 Complete Blood Count Ordered.                                                           EDMS

11:04 Drug Eval Toxicology ED Only Ordered.                                                   EDMS

11:04 Ethyl Alcohol (ethanol) Ordered.                                                        EDMS

11:04 Liver Profile Ordered.                                                                  EDMS

11:04 Salicylate Level Ordered.                                                               EDMS

11:04 Thyroid Stimulating Hormone Ordered.                                                    EDMS

11:23 Financial registration complete.                                                        pm4 

11:30 REGULAR DIET PLASTIC WARE+DIET ordered.                                                 EDMS

12:00 [facility] Payment Agreement was scanned into [employer] and attached to record.               pm4 

13:28 Acetaminophen Level Reviewed.                                                           br1 

13:28 Drug Eval Toxicology ED Only Reviewed.                                                  br1 

13:28 Liver Profile Reviewed.                                                                 br1 

13:28 Salicylate Level Reviewed.                                                              br1 

13:28 Basic Metabolic Profile Reviewed.                                                       br1 

13:28 Complete Blood Count Reviewed.                                                          br1 

13:28 Ethyl Alcohol (ethanol) Reviewed.                                                       br1 

13:28 Thyroid Stimulating Hormone Reviewed.                                                   br1 

13:29 Consult PFS/PSA/Socail Worker: Cleared medically for eval ordered.                      br1 

                                                                                             

09:08 T-Sheet-- Draft Copy was scanned into [employer] and attached to record.                   gb  

                                                                                                  

Signatures:                                                                                       

Dispatcher [employer]                           EDMS                                                 

Marysol Maxwell, Reg                  Reg  gb                                                   

Sinai Latif,RN                    RN   jo3                                                  

Vick Steinberg MD MD   br1                                                  

Mina Calvillo, RN                       RN   ml6                                                  

Tom Louis, Reg                     Reg  pm4                                                  

                                                                                                  

The chart was reviewed and I authenticate all verbal orders and agree with the evaluation and 
treatment provided.Attachments:

                                                                                             

12:00 [facility] Payment Agreement                                                                pm4 

                                                                                             

09:08 T-Sheet-- Draft Copy                                                                    gb  

                                                                                                  

**************************************************************************************************



*** Chart Complete ***
MTDD

## 2017-01-26 ENCOUNTER — HOSPITAL ENCOUNTER (INPATIENT)
Dept: HOSPITAL 53 - M ED | Age: 32
LOS: 4 days | Discharge: HOME | DRG: 751 | End: 2017-01-30
Attending: PSYCHIATRY & NEUROLOGY | Admitting: PSYCHIATRY & NEUROLOGY
Payer: MEDICAID

## 2017-01-26 VITALS — DIASTOLIC BLOOD PRESSURE: 65 MMHG | SYSTOLIC BLOOD PRESSURE: 122 MMHG

## 2017-01-26 VITALS — WEIGHT: 195.77 LBS | BODY MASS INDEX: 24.34 KG/M2 | HEIGHT: 75 IN

## 2017-01-26 VITALS — DIASTOLIC BLOOD PRESSURE: 74 MMHG | SYSTOLIC BLOOD PRESSURE: 128 MMHG

## 2017-01-26 DIAGNOSIS — M25.532: ICD-10-CM

## 2017-01-26 DIAGNOSIS — M54.5: ICD-10-CM

## 2017-01-26 DIAGNOSIS — F33.9: Primary | ICD-10-CM

## 2017-01-26 DIAGNOSIS — Z79.899: ICD-10-CM

## 2017-01-26 DIAGNOSIS — F14.10: ICD-10-CM

## 2017-01-26 LAB
ALBUMIN SERPL BCG-MCNC: 4 GM/DL (ref 3.2–5.2)
ALBUMIN/GLOB SERPL: 1.54 {RATIO} (ref 1–1.93)
ALP SERPL-CCNC: 54 U/L (ref 45–117)
ALT SERPL W P-5'-P-CCNC: 22 U/L (ref 12–78)
AMPHETAMINES UR QL SCN: NEGATIVE
ANION GAP SERPL CALC-SCNC: 9 MEQ/L (ref 8–16)
AST SERPL-CCNC: 26 U/L (ref 15–37)
BENZODIAZ UR QL SCN: NEGATIVE
BILIRUB CONJ SERPL-MCNC: 0.2 MG/DL (ref 0–0.2)
BILIRUB SERPL-MCNC: 0.7 MG/DL (ref 0.2–1)
BUN SERPL-MCNC: 14 MG/DL (ref 7–18)
BZE UR QL SCN: POSITIVE
CALCIUM SERPL-MCNC: 9 MG/DL (ref 8.5–10.1)
CHLORIDE SERPL-SCNC: 108 MEQ/L (ref 98–107)
CO2 SERPL-SCNC: 27 MEQ/L (ref 21–32)
CONTROL LINE: (no result)
CREAT SERPL-MCNC: 0.98 MG/DL (ref 0.7–1.3)
ERYTHROCYTE [DISTWIDTH] IN BLOOD BY AUTOMATED COUNT: 13.6 % (ref 11.5–14.5)
GFR SERPL CREATININE-BSD FRML MDRD: > 60 ML/MIN/{1.73_M2} (ref 60–?)
GLUCOSE SERPL-MCNC: 85 MG/DL (ref 70–105)
MCH RBC QN AUTO: 29.8 PG (ref 27–33)
MCHC RBC AUTO-ENTMCNC: 34.2 G/DL (ref 32–36.5)
MCV RBC AUTO: 87 FL (ref 80–96)
METHADONE UR QL SCN: NEGATIVE
OPIATES UR QL SCN: NEGATIVE
PLATELET # BLD AUTO: 198 K/MM3 (ref 150–450)
POTASSIUM SERPL-SCNC: 3.9 MEQ/L (ref 3.5–5.1)
PROT SERPL-MCNC: 6.6 GM/DL (ref 6.4–8.2)
SODIUM SERPL-SCNC: 144 MEQ/L (ref 136–145)
TRICYCLIC ANTIDEPRESS URINE: NEGATIVE
WBC # BLD AUTO: 5.5 K/MM3 (ref 4–10)

## 2017-01-26 NOTE — EDDOCDS
Physician Documentation                                                                           

[facility]                                                                         

Name: Lorenzo Kowalski                                                                                

Age: 31 yrs                                                                                       

Sex: Male                                                                                         

: 1985                                                                                   

MRN: [id number]                                                                                     

Arrival Date: 2017                                                                          

Time: 07:50                                                                                       

Account#: [id number]                                                                              

Bed D1                                                                                            

Private MD:                                                                                       

Disposition:                                                                                      

17 11:20 Hospitalization ordered by Anjel Loomis for Inpatient Admission. Preliminary     

diagnosis is Suicidal ideations.                                                                

- Bed requested for Admit.                                                                        

- Status is Inpatient Admission.                                                              mcp 

- Condition is Stable.                                                                            

- Problem is new.                                                                                 

- Symptoms are unchanged.                                                                         

                                                                                                  

                                                                                                  

                                                                                                  

Historical:                                                                                       

- Allergies: no known allergies;                                                                  

- Home Meds:                                                                                      

1. Paxil Unknown Oral Unknown once daily not taking                                             

2. trazodone Unknown Oral Unknown nightly                                                       

     (Last dose: 2017 21:00)                                                                

- PMHx: Substance Abuse; Anxiety; Depression; back pain;                                          

- PSHx: none;                                                                                     

- Social history: Smoking status: Patient states was never smoker of tobacco. Patient             

uses alcohol occasionally. street drugs, cocaine, No barriers to communication noted,           

Speaks appropriately for age.                                                                   

- Family history: Not pertinent.                                                                  

- : The pt / caregiver states he / she is not on anticoagulants. Home medication list             

is obtained from the patient.                                                                   

- Exposure Risk Screening:: None identified.                                                      

                                                                                                  

                                                                                                  

Vital Signs:                                                                                      

                                                                                             

08:06 Resp 16; Weight 81.65 kg / 180.01 lbs (R); Height 6 ft. 3 in. (190.50 cm) (R); Pain     ml6 

      0/10;                                                                                       

08:16  / 77; Pulse 58; Resp 16; Temp 97.7(O); Pulse Ox 94% on R/A;                      dpm 

11:48  / 77; Pulse 67; Resp 16; Temp 97.5(O); Pulse Ox 95% on R/A;                      dpm 

08:06 Body Mass Index 22.50 (81.65 kg, 190.50 cm)                                             ml6 

                                                                                                  

MDM:                                                                                              

07:59 Consult PFS/PSA/ ordered.                                                  sd1 

07:59 Consult PFS/PSA/: Patient's case requires discussion with on-call          sd1 

      Psychiatrist ordered.                                                                       

07:59 PSA/PFS to call Nursing Supervisor, to enter patient data on [place] Safe [place] if patient    sd1 

      involuntarily admitted or transferred for SI or HI ordered.                                 

07:59 Confirm accurate psychiatric medication list and times of last dosage ordered.          sd1 

07:59 Detain Pt Until Medically/PFS Cleared ordered.                                          sd1 

08:00 Acetaminophen Level Ordered.                                                            EDMS

08:00 Basic Metabolic Profile Ordered.                                                        EDMS

08:00 Complete Blood Count Ordered.                                                           EDMS

08:00 Drug Eval Toxicology ED Only Ordered.                                                   EDMS

08:00 Ethyl Alcohol (ethanol) Ordered.                                                        EDMS

08:00 Liver Profile Ordered.                                                                  EDMS

08:00 Salicylate Level Ordered.                                                               EDMS

08:00 Thyroid Stimulating Hormone Ordered.                                                    EDMS

08:01 Creatine Phosphokinase Ordered.                                                         EDMS

08:01 ECG WITH READING ER PHYS+CARDIAG ordered.                                               EDMS

08:09 BED REQUEST+ADM ordered.                                                                EDMS

08:14 REGULAR DIET PLASTIC WARE+DIET ordered.                                                 EDMS

09:00 Acetaminophen Level Reviewed.                                                           sd1 

09:00 Basic Metabolic Profile Reviewed.                                                       sd1 

09:00 Drug Eval Toxicology ED Only Reviewed.                                                  sd1 

09:00 Salicylate Level Reviewed.                                                              sd1 

09:00 Complete Blood Count Reviewed.                                                          sd1 

09:00 Ethyl Alcohol (ethanol) Reviewed.                                                       sd1 

09:00 Liver Profile Reviewed.                                                                 sd1 

09:00 Thyroid Stimulating Hormone Reviewed.                                                   sd1 

09:00 Creatine Phosphokinase Reviewed.                                                        sd1 

09:37 Consult PFS/PSA/ complete.                                                 rb  

10:00 Financial registration complete.                                                        lg  

10:55 [facility] Payment Agreement was scanned into [employer] and attached to record.               lg  

11:20 REGULAR DIET PLASTIC WARE+DIET ordered.                                                 EDMS

11:22 Admit to [facility]: ordered.                                                                 EDMS

11:47 [facility] Legal paperwork was scanned into [employer] and attached to record.                    rb  

11:57 Consult PFS/PSA/: Patient's case requires discussion with on-call          rb  

      Psychiatrist complete.                                                                      

11:57 PSA/PFS to call Nursing Supervisor, to enter [place] Safe [place] if patient    rb  

      involuntarily admitted or transferred for SI or HI complete.                                

                                                                                                  

Signatures:                                                                                       

Dispatcher [employer]                           EDMS                                                 

Charmaine Morris MD MD   sd1                                                  

Alexandria Iverson RN                        RN   Maci Corcoran PSA                      PSA  rb                                                   

Angel Hernandez, Osiel                    Reg  lg                                                   

Cyndi Fang RN                       RN   kr3                                                  

Mina Calvillo RN                       RN   ml6                                                  

                                                                                                  

The chart was reviewed and I authenticate all verbal orders and agree with the evaluation and 
treatment provided.Attachments:

10:55 [facility] Payment Agreement                                                                lg  

                                                                                                  

**************************************************************************************************

MTDD

## 2017-01-26 NOTE — EDDOCDS
Nurse's Notes                                                                                     

[facility]                                                                         

Name: Lorenzo Kowalski                                                                                

Age: 31 yrs                                                                                       

Sex: Male                                                                                         

: 1985                                                                                   

MRN: [id number]                                                                                     

Arrival Date: 2017                                                                          

Time: 07:50                                                                                       

Account#: [id number]                                                                              

Bed D1                                                                                            

Private MD:                                                                                       

Diagnosis: Suicidal ideations                                                                     

                                                                                                  

Presentation:                                                                                     

                                                                                             

07:58 Presenting complaint: Patient states: states that he was hospitalized in [place] [address] 

      for cocaine abuse, seen at [facility], D/C from there states had SI with      

      plan to OD on trazadone and paxil lastnight. Mental Health Triage Level: Level 2: The       

      patient displays active suicidal ideations. Adult Sepsis Screening: The patient does        

      not have new or worsening altered mentation. Patient's respiratory rate is less than        

      22. Systolic blood pressure is greater than 100. Patient has a qSOFA score of 0-            

      Negative Sepsis Screen. Mental Health Triage Level: Level 2: The patient displays           

      active suicidal ideations. Suicide/Homicide risk assessment- The patient admits to          

      and/or has been reported to be having suicidal ideations. The patient reports that          

      he/she has been admitted to an inpatient mental health facility in the last 30 days.        

      The patient reports that he/she has a recent or current history of substance abuse. The     

      patient reports that he/she has a prior history of suicide attempt and/or organized         

      plan. The patient reports that he/she has experienced a significant life altering event     

      in the last 30 days. The patient reports that he/she lacks adequate social support. The     

      patient reports he/she has no significant chronic medical condition(s).             

      Status: Patient is not a  or  dependent. Transition of care:          

      patient was not received from another setting of care.                                      

07:58 Acuity: PEARL Level 3                                                                     ml6 

07:58 Method Of Arrival: Walkin/Carried/Asstd                                                 ml6 

07:58 Red Flag criteria, patient assessed and taken directly to a bed.                        6 

                                                                                                  

Triage Assessment:                                                                                

08:05 General: Appears in no apparent distress, Behavior is flat. Pain: Denies pain. Pt       ml6 

      Declines HIV testing. The patient is triaged at the bedside. See Assessment in Nurses       

      Notes section of ED record. Neurological: No deficits noted. Level of Consciousness is      

      awake, alert, Oriented to person, place, time,  are equal bilaterally Moves all        

      extremities. Cardiovascular: No deficits noted. Capillary refill < 3 seconds is brisk       

      in bilateral fingers toes Heart tones S1 S2 present Edema is absent. Pulses are all         

      present. Respiratory: No deficits noted. Airway is patent Respiratory effort is even,       

      unlabored, Respiratory pattern is regular, symmetrical, Breath sounds are clear             

      bilaterally. GI: No deficits noted. Abdomen is flat, non- distended Bowel sounds            

      present X 4 quads. Abd is soft and non tender X 4 quads.                                    

                                                                                                  

Historical:                                                                                       

- Allergies: no known allergies;                                                                  

- Home Meds:                                                                                      

1. Paxil Unknown Oral Unknown once daily not taking                                             

2. trazodone Unknown Oral Unknown nightly                                                       

     (Last dose: 2017 21:00)                                                                

- PMHx: Substance Abuse; Anxiety; Depression; back pain;                                          

- PSHx: none;                                                                                     

- Social history: Smoking status: Patient states was never smoker of tobacco. Patient             

uses alcohol occasionally. street drugs, cocaine, No barriers to communication noted,           

Speaks appropriately for age.                                                                   

- Family history: Not pertinent.                                                                  

- : The pt / caregiver states he / she is not on anticoagulants. Home medication list             

is obtained from the patient.                                                                   

- Exposure Risk Screening:: None identified.                                                      

                                                                                                  

                                                                                                  

Screenin:58 Screening information is obtained from the patient. Fall risk: No risks identified.     ml6 

      Assistance ADL's: requires no assistance with activities of daily living. Abuse/DV          

      Screen: The patient / caregiver reports he/she is: not in a situation that causes fear,     

      pain or injury. Nutritional screening: No deficits noted. Advance Directives:               

      Currently, there is no health care proxy. home support is adequate.                         

                                                                                                  

Assessment:                                                                                       

07:58 General: see triage assessment.                                                         ml6 

08:58 Reassessment: Patient appears in no apparent distress at this time. General: Behavior   kr3 

      is cooperative. Pain: Denies pain. Neurological: Level of Consciousness is awake,           

      alert. Respiratory: Respiratory effort is even, unlabored. Derm: Skin is normal.            

09:00 General: Appears in no apparent distress, comfortable, Behavior is appropriate for age, ml6 

      cooperative. Pain: Denies pain. Neurological: No deficits noted. Level of Consciousness     

      is awake, alert, Oriented to person, place, time. Cardiovascular: No deficits noted.        

      Capillary refill < 3 seconds is brisk in bilateral fingers toes Heart tones S1 S2           

      present. Respiratory: No deficits noted. Airway is patent Respiratory effort is even,       

      unlabored, Respiratory pattern is regular, symmetrical, Breath sounds are clear             

      bilaterally. GI: No deficits noted.                                                         

10:32 Reassessment: Patient appears in no apparent distress at this time. General: Behavior   kr3 

      is cooperative.                                                                             

12:24 General: Appears in no apparent distress, comfortable, Behavior is cooperative. Pain:   mcp 

      Denies pain. Neurological: No deficits noted. Respiratory: Airway is patent Respiratory     

      effort is even, unlabored. Derm: Skin is pink, warm & dry.                                

                                                                                                  

Mental Health Eval:                                                                               

09:57 Mental health consult is initiated at 09:15.  Status: The patient is not a      rb  

       or  dependent. [facility]: The patient is not an          

      established patient of [facility]. Referral Information: Evaluation referral     

      is generated by the patient himself / herself. The patient was referred for evaluation      

      because Pt presented to ED after stopping his meds yesterday (do to ?? side effects,        

      "stopped working"), had increased anxiety and depression. Pt reported threw away his        

      meds early this morning because had thoughts of +SI by overdose on meds. Pt stated          

      increased anxiety and anger; smashed his own phone on the sidewalk this morning on way      

      to [facility] ED. Pt reported left [facility] yesterday after 7 days there do to           

      increased anxiety. Pt stated "emotions flood by end of day". . Subjective: The patients     

      chief complaint is increased depression and anxiety, +SI by overdose, substance abuse..     

      Delusions are denied. Patient's mood is anxious, dysphoric, hopeless, Hallucinations        

      are denied. Mental Health history: depression, abusing marijuana. crack cocaine. panic      

      attacks, sleep disturbance, suicide attempt by hanging at age 16 while in prison. Pt          

      stated made a noose out of a sheet to hang himself, was stopped by the Guard. Mental        

      Health Admissions: None. Current Outpatient Mental Health Services: Therapist / Agency:     

      [facility]. Pt stated stopped going last .. PCP Dr. Thomas in [place] for            

      medication management. . Current living environment is The patient currently lives with     

      his / her father, ,. Patient presents to Emergency Department with the following            

      symptoms within the past 2 weeks: anger, antisocial behavior, anxiety, depressed mood,      

      drug abuse, feelings of helplessness/hopelessness, non-compliance, panic attacks, poor      

      impulse control, sleep disturbance - insomnia, suicidal ideation with plan for pills.       

      Substance abuse: Patient uses cocaine, Last use was 15 hours ago. Patient uses              

      marijuana daily. Mental status exam: Patients appearance is disheveled Patient's            

      behavior is cooperative, minimally responsive Speech is mumbled. slow. Affect is flat.      

      Mood is anxious. dysphoric. Hallucinations are denied. Appetite is normal. Memory is        

      good. Energy level is lethargic. Content of thought is depressive. , Thought process is     

      characterized by flight of ideas. Cognitive level is oriented to person, place, time        

      and situation Patient's insight is poor. Judgement is fair. Rapport with interviewer is     

      good. Suicidal Ideation present with a plan to kill self by pills. Homicidal ideation       

      is not present. Disposition: Medically cleared for disposition by Charmaine Morris MD Psychiatric Consult is performed by phone with Dr Anjel Loomis MD. [facility] Admission       

      Criteria: The patient is experiencing suicidal ideation. The patient displays symptoms      

      of severe psychiatric disorder resulting in disordered behavior and significant             

      interference with his / her ability to maintain self care. Severe Anxiety. The patient      

      requires continuous observation and/or control to protect self, others or property. The     

      patient's care requires a multi-modal treatment plan under close supervision and            

      coordination due to the complexity and severity of the patient's symptoms. Legal            

      Status: Patient's legal status will be Emergency admission: 9.39. [place] Safe [place]: [place]     

      Safe [place] is applicable to this patient. The patient poses a risk to self or other and       

      the Nursing Supervisor has been notified. He/She will enter the patient's data.             

11:08 Pt states preferred pharmacy is: [facility] in [place] and [facility] in [place] .      rb  

11:44 DSM-V Differential Diagnosis: Major Depressive Disorder unspecified (F33.9). Awaiting:  rb  

      transfer to [facility].                                                                           

                                                                                                  

Vital Signs:                                                                                      

08:06 Resp 16; Weight 81.65 kg (R); Height 6 ft. 3 in. (190.50 cm) (R); Pain 0/10;            ml6 

08:16  / 77; Pulse 58; Resp 16; Temp 97.7(O); Pulse Ox 94% on R/A;                      dpm 

11:48  / 77; Pulse 67; Resp 16; Temp 97.5(O); Pulse Ox 95% on R/A;                      dpm 

08:06 Body Mass Index 22.50 (81.65 kg, 190.50 cm)                                             ml6 

                                                                                                  

Vitals:                                                                                           

07:54 Log In Time: 2017 at 07:51.                                                 ml6 

                                                                                                  

ED Course:                                                                                        

07:51 Patient visited by Angel Hernandez, Osiel.                                                lg  

07:51 Patient moved to Waiting                                                                lg  

07:52 Patient moved to [facility]                                                                   ml6 

07:59 Charmaine Morris MD is Attending Physician.                                      sd1 

07:59 Patient visited by Charmaine Morris MD.                                          sd1 

08:03 Triage Initiated                                                                        ml6 

08:19 Patient visited by Clifford Almanza.                                                      dpm 

08:19 Pt greeted and oriented to ED. Patient advised of names of staff involved in care,      dpm 

      location of call bell, wait times and NPO status. Patient has correct armband on for        

      positive identification. Placed in gown. Placed in psych safe attire. Security              

      observing. Property removed, inventory done, secured in belongings bag- placed in           

      locked locker. Placed in locker 3. Psych Safety Check: Location: Psych Room. Visual         

      Assessment: Cooperative.                                                                    

08:22 Acetaminophen Level Sent.                                                               dpm 

08:22 Basic Metabolic Profile Sent.                                                           dpm 

08:22 Complete Blood Count Sent.                                                              dpm 

08:22 Drug Eval Toxicology ED Only Sent.                                                      dpm 

08:22 Ethyl Alcohol (ethanol) Sent.                                                           dpm 

08:22 Liver Profile Sent.                                                                     dpm 

08:22 Salicylate Level Sent.                                                                  dpm 

08:22 Thyroid Stimulating Hormone Sent.                                                       dpm 

08:30 Patient visited by Clifford Almanza.                                                      dpm 

08:43 Patient visited by Clifford Almanza.                                                      dpm 

08:57 Patient visited by Clifford Almanza.                                                      dpm 

08:59 The patient / caregiver is instructed regarding the plan of care and ED course.         kr3 

08:59 Diet tray given.                                                                        kr3 

08:59 No IV's were initiated during this patient's visit. No procedures done that require     kr3 

      assistance.                                                                                 

09:12 Patient visited by Clifford Almanza.                                                      dpm 

09:31 Patient visited by Clifford Almanza.                                                      dpm 

09:51 Patient visited by Clifford Almanza.                                                      dpm 

10:06 Patient visited by Clifford Almanza.                                                      dpm 

10:18 Patient visited by Clifford Almanza.                                                      dpm 

10:33 Patient visited by Clifford Almanza.                                                      dpm 

10:45 Patient visited by Clifford Almanza.                                                      dpm 

10:54 Patient name changed from Lorenzo\S\Raimundo\S\Dex\S\ to Lorenzo\S\ANNIE\S\Dex.                  
   EDMS

10:55 [facility] Payment Agreement was scanned into [employer] and attached to record.               lg  

11:01 Patient visited by Clifford Almanza.                                                      dpm 

11:19 Patient visited by Clifford Almanza.                                                      dpm 

11:20 Anjel Loomis MD is Hospitalizing Provider.                                           sd1 

11:30 Patient visited by Clifford Almanza.                                                      dpm 

11:34 EKG done. Reviewed by Charmaine Morris MD.                                         jrd 

11:47 Patient visited by Clifford Almanza.                                                      dpm 

11:47 [facility] Legal paperwork was scanned into [employer] and attached to record.                    rb  

12:07 Patient visited by Clifford Almanza.                                                      dpm 

12:15 Patient visited by Clifford Almanza.                                                      dpm 

12:49 Patient moved to D1                                                                     dpm 

                                                                                                  

Attachments:                                                                                      

11:47 [facility] Legal paperwork                                                                     rb  

                                                                                                  

Order Results:                                                                                    

Lab Order: Acetaminophen Level; SPEC'M 17 08:12                                             

      Test: ACETAMINOPHEN LEVEL; Value: < 2.0; Range: 10.0-30.0; Abnormal: Below low normal;      

      Units: UG/ML; Status: F                                                                     

Lab Order: Basic Metabolic Profile; SPEC'M 17 08:12                                         

      Test: GLUCOSE, FASTING; Value: 85; Range: ; Units: MG/DL; Status: F                   

      Test: BLOOD UREA NITROGEN; Value: 14; Range: 7-18; Units: MG/DL; Status: F                  

      Test: CREATININE FOR GFR; Value: 0.98; Range: 0.70-1.30; Units: MG/DL; Status: F            

      Test: GLOMERULAR FILTRATION RATE; Value: > 60.0; Range: >60; Status: F                      

      Test: SODIUM LEVEL; Value: 144; Range: 136-145; Units: MEQ/L; Status: F                     

      Test: POTASSIUM SERUM; Value: 3.9; Range: 3.5-5.1; Units: MEQ/L; Status: F                  

      Test: CHLORIDE LEVEL; Value: 108; Range: ; Abnormal: Above high normal; Units:        

      MEQ/L; Status: F                                                                            

      Test: CARBON DIOXIDE LEVEL; Value: 27; Range: 21-32; Units: MEQ/L; Status: F                

      Test: ANION GAP; Value: 9; Range: 8-16; Units: MEQ/L; Status: F                             

      Test: CALCIUM LEVEL; Value: 9.0; Range: 8.5-10.1; Units: MG/DL; Status: F                   

      Test Note: &nbsp;; Units are mL/min/1.73 m2 Chronic Kidney Disease Staging per NKF:       

      Stage I & II GFR >=60 Normal to Mildly Decreased Stage III GFR 30-59 Moderately           

      Decreased Stage IV GFR 15-29 Severely Decreased Stage V GFR <15 Very Little GFR Left        

      ESRD GFR <15 on RRT                                                                         

Lab Order: Complete Blood Count; SPEC'M 17 08:12                                            

      Test: WHITE BLOOD COUNT; Value: 5.5; Range: 4.0-10.0; Units: K/mm3; Status: F               

      Test: RED BLOOD COUNT; Value: 5.37; Range: 4.30-6.10; Units: M/mm3; Status: F               

      Test: HEMOGLOBIN; Value: 16.0; Range: 14.0-18.0; Units: g/dl; Status: F                     

      Test: HEMATOCRIT; Value: 46.8; Range: 42.0-52.0; Units: %; Status: F                        

      Test: MEAN CORPUSCULAR VOLUME; Value: 87.0; Range: 80.0-96.0; Units: fl; Status: F          

      Test: MEAN CORPUSCULAR HEMOGLOBIN; Value: 29.8; Range: 27.0-33.0; Units: pg; Status: F      

      Test: MEAN CORPUSCULAR HGB CONC; Value: 34.2; Range: 32.0-36.5; Units: g/dl; Status: F      

      Test: RED CELL DISTRIBUTION WIDTH; Value: 13.6; Range: 11.5-14.5; Units: %; Status: F       

      Test: PLATELET COUNT, AUTOMATED; Value: 198; Range: 150-450; Units: k/mm3; Status: F        

Lab Order: Drug Eval Toxicology ED Only; SPEC'M 17 08:12                                    

      Test: AMPHETAMINES LEVEL URINE; Value: NEGATIVE; Range: NEGATIVE; Status: F                 

      Test: BARBITURATES URINE; Value: NEGATIVE; Range: NEGATIVE; Status: F                       

      Test: BENZODIAZEPINES URINE; Value: NEGATIVE; Range: NEGATIVE; Status: F                    

      Test: CANNABINOIDS URINE; Value: POSITIVE; Range: NEGATIVE; Abnormal: Above high            

      normal; Status: F                                                                           

      Test: COCAINE METABOLITE URINE; Value: POSITIVE; Range: NEGATIVE; Abnormal: Above high      

      normal; Status: F                                                                           

      Test: METHADONE URINE; Value: NEGATIVE; Range: NEGATIVE; Status: F                          

      Test: OPIATES URINE; Value: NEGATIVE; Range: NEGATIVE; Status: F                            

      Test: TRICYCLIC ANTIDEPRESS URINE; Value: NEGATIVE; Range: NEGATIVE; Status: F              

      Test Note: &nbsp;; FALSE POSITIVE RESULTS CAN BE CAUSED BY THE USE OF PANTOPRAZOLE        

      (PROTONIX).                                                                                 

Lab Order: Ethyl Alcohol (ethanol); SPEC' 17 08:12                                         

      Test: ETHYL ALCOHOL (ETHANOL); Value: < 0.003; Range: 0.000-0.010; Units: %; Status: F      

Lab Order: Liver Profile; SPEC 17 08:12                                                   

      Test: AST/SGOT; Value: 26; Range: 15-37; Units: U/L; Status: F                              

      Test: ALT/SGPT; Value: 22; Range: 12-78; Units: U/L; Status: F                              

      Test: ALKALINE PHOSPHATASE; Value: 54; Range: ; Units: U/L; Status: F                 

      Test: BILIRUBIN,TOTAL; Value: 0.7; Range: 0.2-1.0; Units: MG/DL; Status: F                  

      Test: BILIRUBIN,DIRECT; Value: 0.2; Range: 0.0-0.2; Units: MG/DL; Status: F                 

      Test: TOTAL PROTEIN; Value: 6.6; Range: 6.4-8.2; Units: GM/DL; Status: F                    

      Test: ALBUMIN; Value: 4.0; Range: 3.2-5.2; Units: GM/DL; Status: F                          

      Test: ALBUMIN/GLOBULIN RATIO; Value: 1.54; Range: 1.00-1.93; Status: F                      

Lab Order: Salicylate Level; SPEC 17 08:12                                                

      Test: SALICYLATE LEVEL; Value: < 1.7; Range: 5.0-30.0; Abnormal: Below low normal;          

      Units: MG/DL; Status: F                                                                     

Lab Order: Thyroid Stimulating Hormone; SPEC 17 08:12                                     

      Test: THYROID STIMULATING HORMONE; Value: 1.490; Range: 0.358-3.740; Units: uIU/ML;         

      Status: F                                                                                   

Lab Order: Creatine Phosphokinase; SPEC'M 17 08:12                                          

      Test: CPK CREATINE PHOSPHOKINASE; Value: 170; Range: ; Units: U/L; Status: F          

                                                                                                  

Outcome:                                                                                          

08:59 No special radiology studies were completed.                                            [id number] 

11:20 Decision to Hospitalize by Provider.                                                    sd1 

12:25 Discharge Assessment: patient administered narcotics - no. The following High Risk      [facility] 

      Discharge criteria are identified: None. Admitted to Psych accompanied by tech, via         

      wheelchair, with chart. Condition: stable.                                                  

12:52 Patient left the ED.                                                                    [facility] 

                                                                                                  

Signatures:                                                                                       

Dispatcher [employer]                           EDMS                                                 

Charmaine Morris MD MD   sd1                                                  

Alexandria Iverson RN                        RN   mcp                                                  

Maci Haley, PSA                      PSA  rb                                                   

Angel Hernandez, Reg                    Reg  lg                                                   

Cyndi Fang,BATSHEVA                       RN   [id number]                                                  

Mina Calvillo RN                       RN   [id number]                                                  

Clifford Almanza dpm, Joseph, PCA                   PCA  jrd                                                  

                                                                                                  

**************************************************************************************************

VERONICAD

## 2017-01-27 VITALS — DIASTOLIC BLOOD PRESSURE: 63 MMHG | SYSTOLIC BLOOD PRESSURE: 112 MMHG

## 2017-01-27 VITALS — DIASTOLIC BLOOD PRESSURE: 80 MMHG | SYSTOLIC BLOOD PRESSURE: 122 MMHG

## 2017-01-27 RX ADMIN — IBUPROFEN PRN MG: 400 TABLET, FILM COATED ORAL at 15:13

## 2017-01-27 NOTE — REP
CT Head without contrast

 

HISTORY:  Head injury

 

COMPARISON: None

 

There is no intraparenchymal hemorrhage, acute infarct,  mass or midline shift.

The ventricular system is normal in appearance.  There is no extra cerebral

collection.  There is no fracture.  The visualized sinuses are clear.

 

IMPRESSION:  There is no intracranial lesion.

 

 

 

 

Signed by

Christopher Mclain MD 01/27/2017 11:49 A

## 2017-01-27 NOTE — REP
LUMBAR SPINE SERIES:  Five views.

 

HISTORY:  Back pain.

 

FINDINGS:  Five views of the lumbar spine show preserved vertebral body heights.

There is slight straightening.  There is disc space narrowing at L4-5 consistent

with early degenerative disc disease at this level.  Other disc spaces are

maintained.  Pedicles and posterior elements are intact.  There is no evidence of

spondylolysis or spondylolisthesis.  Psoas margins are symmetric.  Sacrum and SI

joints are intact.

 

IMPRESSION: Mild disc space narrowing at L4-5.  Straightening.  Otherwise

negative.

 

 

Signed by

Sagar Morton MD 01/27/2017 02:01 P

## 2017-01-27 NOTE — HPEPDOC
Medical History and Physical


Date of Admission


2017 at 12:35





History and Physical





PCP: LINDA PEREZ


ATTENDING: Dr. Heladio Reyes





HPI: 31yoM admitted to [facility] for undifferentiated depressive disorder, being 

medically examined today. Patient states he has chronic low back pain which 

radiates down the posterior aspect of his right lower extremity. Sometimes his 

leg gives out. He denies any numbness or tingling. No bowel or bladder 

incontinence .


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 

injured his left wrist. He states he is still having pain. It feels weak. There 

has been no erythema, no edema. 


He also states he was recently working in a garage when a pneumatic air hose 

hit him on the back of the head. He was standing on a ladder at the time. He 

states he fell from the ladder but did not hit the ground. He denies loss of 

consciousness. He states he experienced a large knot on the back of his head. 

He reports a funny taste in his mouth at the time however no bleeding. No 

lightheadedness. No headache. No memory loss. No dizziness. It's finishing work 

at the time of the incident. He did not seek medical care. 


Denies any fevers, chills, weakness, fatigue, HA, CP, SOB, cough, palpitations, 

abdominal pain, N/V/D or changes in bowel or bladder habits.





PMHx: 


Anxiety


depression


Chronic back pain


Substance use





PSHX:


Denies





SOCHX:


Resides in: [facility]


Marital Status: Single


Kids: None


Employment: Unemployed, sometimes works as a 


Tobacco use: Denies


ETOH: 12 drinks every 2 months


Illicit Drugs: Cocaine once per week, marijuana daily


IV Drug Use: Denies


Tattoos done unprofessionally: Denies 





FAMHX:


Mother: Alive, well 


Father: Alive, well


Siblings: Alive, well


Children: None


Unexpected deaths due to medical reasons: None.





ROS:


As noted in HPI, otherwise 11pt ROS of systems reviewed and unremarkable. 


                 


PE:      


GEN: 30 yo male, appears stated age. Well-nourished, well developed. No acute 

distress. Alert and oriented x 3. Pleasant, interactive. 


HEENT: Normocephalic, atraumatic. Pupils are equal, round, and reactive to 

light. Extraocular movements are intact. No nystagmus appreciated. Sclera are 

nonicteric. Conjunctiva without injection. Nose midline. Nasal turbinates 

without bogginess. EACs both patent BL. TMs both visualized and gray with good 

cone of light, no bulging or erythema. No facial asymmetry. Moist mucous 

membranes. Dentition fair. Pharynx pink and moist, no cobblestoning. Neck supple

, trachea midline. No lymphadenopathy or thyromegaly appreciated. 


CHEST: Regular rate and rhythm, +S1, +S2


LUNGS: Clear to auscultation bilaterally. No wheezes, rales, or rhonchi. 

Breathing appears symmetric and easy. Patient is speaking in full sentences. No 

accessory muscle use.


ABD: Round, soft, non-tender, non-distended. +Bowel sounds throughout. No 

rebound or guarding. No costovertebral angle tenderness.


EXT: Pulses 2+ bilaterally dorsalis pedis and radial. No lower extremity edema 

appreciated.


SKIN: Pink, dry, warm. Capillary refill <2sec. No rashes.


NEURO: Alert and oriented x 3. Cranial nerves III-XII are intact. No focal 

deficits appreciated. 





EK17. SINUS BRADYCARDIA WITH SINUS ARRHYTHMIA


PROBABLE EARLY REPOLARIZATION, CLINICAL CORRELATION


NO PRIOR FOR COMPARISON








A&P:  31yoM admitted to [facility] for undifferentiated depressive disorder


1. Psych. Plan per Psychiatry. EKG on file.  


2. Recent head injury. There is no palpable head or scalp injury at this time. 

Check CT scan of the head.


3. Borderline EKG. No cardiac signs or symptoms appreciated on exam, follow 

with PCP.


4. Follow up with PCP on discharge.


5. Substance use. Per psychiatry. 


6. Chronic low back pain. Tylenol 650 mg every 4 hours as needed. Check x-ray 

of lumbosacral spine.


7. Left wrist pain/status post injury. Possible left wrist strain/sprain. 

Tylenol 650 mg every 4 hours as needed. Check x-ray of the left wrist.


8. Staff member was present throughout exam. Oh [place] aide.





Vital Signs





 Vital Signs








Label Value  Date Time


 


Patient Temperature 97.4 degrees F 17 0645


 


Temperature Source Tympanic 17 0645


 


Pulse 59 17 0645


 


Respiratory Rate 16 bpm 17 0645


 


Blood Pressure Assessment 112/63 (79) 17 0645











Laboratory Data


Labs 24H











Item Value  Date Time


 


White Blood Count 5.5 K/mm3 17 0812


 


Red Blood Count 5.37 M/mm3 17 0812


 


Hemoglobin 16.0 g/dl 17 0812


 


Hematocrit 46.8 % 17 0812


 


Mean Corpuscular Volume 87.0 fl 17 0812


 


Mean Corpuscular Hemoglobin 29.8 pg 17 0812


 


Mean Corpuscular Hemoglobin Concent 34.2 g/dl 17 0812


 


Red Cell Distribution Width 13.6 % 17 0812


 


Platelet Count 198 k/mm3 17 0812


 


Sodium Level 144 MEQ/L 17 0812


 


Potassium Level 3.9 MEQ/L 17 0812


 


Chloride Level 108 MEQ/L H 17 0812


 


Carbon Dioxide Level 27 MEQ/L 17 0812


 


Anion Gap 9 MEQ/L 17 0812


 


Blood Urea Nitrogen 14 MG/DL 17 0812


 


Creatinine 0.98 MG/DL 17 0812


 


Glomerular Filtration Rate > 60.0 17 0812


 


Fasting Glucose 85 MG/DL 17 0812


 


Calcium Level 9.0 MG/DL 17 0812


 


Total Bilirubin 0.7 MG/DL 17 0812


 


Direct Bilirubin 0.2 MG/DL 17 0812


 


Aspartate Amino Transf (AST/SGOT) 26 U/L 17 0812


 


Alanine Aminotransferase (ALT/SGPT) 22 U/L 17 0812


 


Alkaline Phosphatase 54 U/L 17 0812


 


Total Creatine Kinase 170 U/L 17 0812


 


Total Protein 6.6 GM/DL 17 0812


 


Albumin 4.0 GM/DL 17 0812


 


Albumin/Globulin Ratio 1.54 17 0812


 


Thyroid Stimulating Hormone (TSH) 1.490 uIU/ML 17 0812


 


Salicylates Level < 1.7 MG/DL L 17 0812


 


Urine Opiates Screen NEGATIVE 17 0812


 


Urine Methadone Screen NEGATIVE 17 0812


 


Acetaminophen Level < 2.0 UG/ML L 17 08


 


Urine Barbiturates, Qualitative NEGATIVE 17 08


 


Urine Tricyclic Antidepressants NEGATIVE 17 08


 


Urine Amphetamine Level NEGATIVE 17


 


Urine Benzodiazepines Screen NEGATIVE 17 08


 


Urine Cocaine Metabolite POSITIVE H 17 08


 


Urine Cannabinoids POSITIVE H 17 08


 


Ethyl Alcohol Level < 0.003 % 17 08











Home Medications


Scheduled


Paroxetine Hydrochloride (Paxil) 20 Mg Tab 20 MG PO DAILY anxiety/depression 


   PT STATES HE IS NOT TAKING- FILLED 17 





Scheduled PRN


Ibuprofen (Ibuprofen) 200 Mg Cap Unknown Dose PO DAILYPRN PRN PRN BACK PAIN 


Trazodone HCl (Trazodone HCl) 150 Mg Tab 150 MG PO QHSP PRN PRN INSOMNIA 





Allergies


Coded Allergies:  


     Bee Venom (Unverified  Allergy, Mild, 17)


     No Known Drug Allergy (Unverified  Allergy, Unknown, 17)








Chloé Powell 2017 10:42

## 2017-01-27 NOTE — ECGEPIP
Stationary ECG Study

                           [facility] - ED

                                       

                                       Test Date:    2017

Pat Name:     BRISA GALVIN             Department:   

Patient ID:   [id number]                 Room:         -

Gender:       M                        Technician:   emily

:          1985               Requested By: Charmaine Morris 

Order Number: [id number]     Reading MD:   Charmaine Morris

                                 Measurements

Intervals                              Axis          

Rate:         51                       P:            61

MT:           157                      QRS:          84

QRSD:         99                       T:            71

QT:           409                                    

QTc:          378                                    

                           Interpretive Statements

SINUS BRADYCARDIA WITH SINUS ARRHYTHMIA

PROBABLE EARLY REPOLARIZATION, CLINICAL CORRELATION

NO PRIOR FOR COMPARISON

Electronically Signed On 2017 7:19:06 EST by Charmaine Morris

## 2017-01-27 NOTE — REP
LEFT WRIST, FOUR VIEWS:

 

HISTORY: Pain.

 

There is no acute fracture or dislocation. The joint spaces are normal in

appearance.

 

IMPRESSION:

 

There is no acute fracture or dislocation.

 

 

Signed by

Christopher Mclain MD 01/27/2017 12:17 P

## 2017-01-28 VITALS — DIASTOLIC BLOOD PRESSURE: 66 MMHG | SYSTOLIC BLOOD PRESSURE: 120 MMHG

## 2017-01-28 VITALS — SYSTOLIC BLOOD PRESSURE: 121 MMHG | DIASTOLIC BLOOD PRESSURE: 65 MMHG

## 2017-01-28 RX ADMIN — IBUPROFEN PRN MG: 400 TABLET, FILM COATED ORAL at 15:11

## 2017-01-28 RX ADMIN — IBUPROFEN PRN MG: 400 TABLET, FILM COATED ORAL at 21:48

## 2017-01-28 NOTE — EDDOCDS
Nurse's Notes                                                                                     

[facility]                                                                         

Name: Lorenzo Kowalski                                                                                

Age: 31 yrs                                                                                       

Sex: Male                                                                                         

: 1985                                                                                   

MRN: [id number]                                                                                     

Arrival Date: 2017                                                                          

Time: 07:50                                                                                       

Account#: [id number]                                                                              

Bed D1                                                                                            

Private MD:                                                                                       

Diagnosis: Suicidal ideations                                                                     

                                                                                                  

Presentation:                                                                                     

                                                                                             

07:58 Presenting complaint: Patient states: states that he was hospitalized in [place] [address] 

      for cocaine abuse, seen at [facility], D/C from there states had SI with      

      plan to OD on trazadone and paxil lastnight. Mental Health Triage Level: Level 2: The       

      patient displays active suicidal ideations. Adult Sepsis Screening: The patient does        

      not have new or worsening altered mentation. Patient's respiratory rate is less than        

      22. Systolic blood pressure is greater than 100. Patient has a qSOFA score of 0-            

      Negative Sepsis Screen. Mental Health Triage Level: Level 2: The patient displays           

      active suicidal ideations. Suicide/Homicide risk assessment- The patient admits to          

      and/or has been reported to be having suicidal ideations. The patient reports that          

      he/she has been admitted to an inpatient mental health facility in the last 30 days.        

      The patient reports that he/she has a recent or current history of substance abuse. The     

      patient reports that he/she has a prior history of suicide attempt and/or organized         

      plan. The patient reports that he/she has experienced a significant life altering event     

      in the last 30 days. The patient reports that he/she lacks adequate social support. The     

      patient reports he/she has no significant chronic medical condition(s).             

      Status: Patient is not a  or  dependent. Transition of care:          

      patient was not received from another setting of care.                                      

07:58 Acuity: PEARL Level 3                                                                     ml6 

07:58 Method Of Arrival: Walkin/Carried/Asstd                                                 ml6 

07:58 Red Flag criteria, patient assessed and taken directly to a bed.                        6 

                                                                                                  

Triage Assessment:                                                                                

08:05 General: Appears in no apparent distress, Behavior is flat. Pain: Denies pain. Pt       ml6 

      Declines HIV testing. The patient is triaged at the bedside. See Assessment in Nurses       

      Notes section of ED record. Neurological: No deficits noted. Level of Consciousness is      

      awake, alert, Oriented to person, place, time,  are equal bilaterally Moves all        

      extremities. Cardiovascular: No deficits noted. Capillary refill < 3 seconds is brisk       

      in bilateral fingers toes Heart tones S1 S2 present Edema is absent. Pulses are all         

      present. Respiratory: No deficits noted. Airway is patent Respiratory effort is even,       

      unlabored, Respiratory pattern is regular, symmetrical, Breath sounds are clear             

      bilaterally. GI: No deficits noted. Abdomen is flat, non- distended Bowel sounds            

      present X 4 quads. Abd is soft and non tender X 4 quads.                                    

                                                                                                  

Historical:                                                                                       

- Allergies: no known allergies;                                                                  

- Home Meds:                                                                                      

1. Paxil Unknown Oral Unknown once daily not taking                                             

2. trazodone Unknown Oral Unknown nightly                                                       

     (Last dose: 2017 21:00)                                                                

- PMHx: Substance Abuse; Anxiety; Depression; back pain;                                          

- PSHx: none;                                                                                     

- Social history: Smoking status: Patient states was never smoker of tobacco. Patient             

uses alcohol occasionally. street drugs, cocaine, No barriers to communication noted,           

Speaks appropriately for age.                                                                   

- Family history: Not pertinent.                                                                  

- : The pt / caregiver states he / she is not on anticoagulants. Home medication list             

is obtained from the patient.                                                                   

- Exposure Risk Screening:: None identified.                                                      

                                                                                                  

                                                                                                  

Screenin:58 Screening information is obtained from the patient. Fall risk: No risks identified.     ml6 

      Assistance ADL's: requires no assistance with activities of daily living. Abuse/DV          

      Screen: The patient / caregiver reports he/she is: not in a situation that causes fear,     

      pain or injury. Nutritional screening: No deficits noted. Advance Directives:               

      Currently, there is no health care proxy. home support is adequate.                         

                                                                                                  

Assessment:                                                                                       

07:58 General: see triage assessment.                                                         ml6 

08:58 Reassessment: Patient appears in no apparent distress at this time. General: Behavior   kr3 

      is cooperative. Pain: Denies pain. Neurological: Level of Consciousness is awake,           

      alert. Respiratory: Respiratory effort is even, unlabored. Derm: Skin is normal.            

09:00 General: Appears in no apparent distress, comfortable, Behavior is appropriate for age, ml6 

      cooperative. Pain: Denies pain. Neurological: No deficits noted. Level of Consciousness     

      is awake, alert, Oriented to person, place, time. Cardiovascular: No deficits noted.        

      Capillary refill < 3 seconds is brisk in bilateral fingers toes Heart tones S1 S2           

      present. Respiratory: No deficits noted. Airway is patent Respiratory effort is even,       

      unlabored, Respiratory pattern is regular, symmetrical, Breath sounds are clear             

      bilaterally. GI: No deficits noted.                                                         

10:32 Reassessment: Patient appears in no apparent distress at this time. General: Behavior   kr3 

      is cooperative.                                                                             

12:24 General: Appears in no apparent distress, comfortable, Behavior is cooperative. Pain:   mcp 

      Denies pain. Neurological: No deficits noted. Respiratory: Airway is patent Respiratory     

      effort is even, unlabored. Derm: Skin is pink, warm & dry.                                

                                                                                                  

Mental Health Eval:                                                                               

09:57 Mental health consult is initiated at 09:15.  Status: The patient is not a      rb  

       or  dependent. [facility]: The patient is not an          

      established patient of [facility]. Referral Information: Evaluation referral     

      is generated by the patient himself / herself. The patient was referred for evaluation      

      because Pt presented to ED after stopping his meds yesterday (do to ?? side effects,        

      "stopped working"), had increased anxiety and depression. Pt reported threw away his        

      meds early this morning because had thoughts of +SI by overdose on meds. Pt stated          

      increased anxiety and anger; smashed his own phone on the sidewalk this morning on way      

      to [facility] ED. Pt reported left [facility] yesterday after 7 days there do to           

      increased anxiety. Pt stated "emotions flood by end of day". . Subjective: The patients     

      chief complaint is increased depression and anxiety, +SI by overdose, substance abuse..     

      Delusions are denied. Patient's mood is anxious, dysphoric, hopeless, Hallucinations        

      are denied. Mental Health history: depression, abusing marijuana. crack cocaine. panic      

      attacks, sleep disturbance, suicide attempt by hanging at age 16 while in [facility]. Pt          

      stated made a noose out of a sheet to hang himself, was stopped by the Guard. Mental        

      Health Admissions: None. Current Outpatient Mental Health Services: Therapist / Agency:     

      [facility]. Pt stated stopped going last .. PCP Dr. Thomas in [place] for            

      medication management. . Current living environment is The patient currently lives with     

      his / her father, ,. Patient presents to Emergency Department with the following            

      symptoms within the past 2 weeks: anger, antisocial behavior, anxiety, depressed mood,      

      drug abuse, feelings of helplessness/hopelessness, non-compliance, panic attacks, poor      

      impulse control, sleep disturbance - insomnia, suicidal ideation with plan for pills.       

      Substance abuse: Patient uses cocaine, Last use was 15 hours ago. Patient uses              

      marijuana daily. Mental status exam: Patients appearance is disheveled Patient's            

      behavior is cooperative, minimally responsive Speech is mumbled. slow. Affect is flat.      

      Mood is anxious. dysphoric. Hallucinations are denied. Appetite is normal. Memory is        

      good. Energy level is lethargic. Content of thought is depressive. , Thought process is     

      characterized by flight of ideas. Cognitive level is oriented to person, place, time        

      and situation Patient's insight is poor. Judgement is fair. Rapport with interviewer is     

      good. Suicidal Ideation present with a plan to kill self by pills. Homicidal ideation       

      is not present. Disposition: Medically cleared for disposition by Charmaine Morris MD Psychiatric Consult is performed by phone with Dr Anjel Loomis MD. [facility] Admission       

      Criteria: The patient is experiencing suicidal ideation. The patient displays symptoms      

      of severe psychiatric disorder resulting in disordered behavior and significant             

      interference with his / her ability to maintain self care. Severe Anxiety. The patient      

      requires continuous observation and/or control to protect self, others or property. The     

      patient's care requires a multi-modal treatment plan under close supervision and            

      coordination due to the complexity and severity of the patient's symptoms. Legal            

      Status: Patient's legal status will be Emergency admission: 9.39. [place] Safe [place]: [place]     

      Safe [place] is applicable to this patient. The patient poses a risk to self or other and       

      the Nursing Supervisor has been notified. He/She will enter the patient's data.             

11:08 Pt states preferred pharmacy is: [facility] in [facility] and [facility] in [place] .      rb  

11:44 DSM-V Differential Diagnosis: Major Depressive Disorder unspecified (F33.9). Awaiting:  rb  

      transfer to [facility].                                                                           

14:39 Insurance Pre-Certification: approved by: Deb\TATO\[facility] for 5 days; - and review rb  

      on 2017 with [place] #[phone]. [phone]. Auth# [id number]..                          

                                                                                                  

Vital Signs:                                                                                      

08:06 Resp 16; Weight 81.65 kg (R); Height 6 ft. 3 in. (190.50 cm) (R); Pain 0/10;            ml6 

08:16  / 77; Pulse 58; Resp 16; Temp 97.7(O); Pulse Ox 94% on R/A;                      dpm 

11:48  / 77; Pulse 67; Resp 16; Temp 97.5(O); Pulse Ox 95% on R/A;                      dpm 

08:06 Body Mass Index 22.50 (81.65 kg, 190.50 cm)                                             ml6 

                                                                                                  

Vitals:                                                                                           

07:54 Log In Time: 2017 at 07:51.                                                 ml6 

                                                                                                  

ED Course:                                                                                        

07:51 Patient visited by Angel Hernandez Reg.                                                lg  

07:51 Patient moved to [facility]  

07:52 Patient moved to [facility]                                                                   ml6 

07:59 Charmaine Morris MD is Attending Physician.                                      sd1 

07:59 Patient visited by Charmaine Morris MD.                                          sd1 

08:03 Triage Initiated                                                                        ml6 

08:19 Patient visited by Clifford Almanza.                                                      dpm 

08:19 Pt greeted and oriented to ED. Patient advised of names of staff involved in care,      dpm 

      location of call bell, wait times and NPO status. Patient has correct armband on for        

      positive identification. Placed in gown. Placed in psych safe attire. Security              

      observing. Property removed, inventory done, secured in belongings bag- placed in           

      locked locker. Placed in locker 3. Psych Safety Check: Location: Psych Room. Visual         

      Assessment: Cooperative.                                                                    

08:22 Acetaminophen Level Sent.                                                               dpm 

08:22 Basic Metabolic Profile Sent.                                                           dpm 

08:22 Complete Blood Count Sent.                                                              dpm 

08:22 Drug Eval Toxicology ED Only Sent.                                                      dpm 

08:22 Ethyl Alcohol (ethanol) Sent.                                                           dpm 

08:22 Liver Profile Sent.                                                                     dpm 

08:22 Salicylate Level Sent.                                                                  dpm 

08:22 Thyroid Stimulating Hormone Sent.                                                       dpm 

08:30 Patient visited by Clifford Almanza.                                                      dpm 

08:43 Patient visited by Clifford Almanza.                                                      dpm 

08:57 Patient visited by Clifford Almanza.                                                      dpm 

08:59 The patient / caregiver is instructed regarding the plan of care and ED course.         kr3 

08:59 Diet tray given.                                                                        kr3 

08:59 No IV's were initiated during this patient's visit. No procedures done that require     kr3 

      assistance.                                                                                 

09:12 Patient visited by Clifford Almanza.                                                      dpm 

09:31 Patient visited by Clifford Almanza.                                                      dpm 

09:51 Patient visited by Clifford Almanza.                                                      dpm 

10:06 Patient visited by Clifford Almanza.                                                      dpm 

10:18 Patient visited by Clifford Almanza.                                                      dpm 

10:33 Patient visited by Clifford Almanza.                                                      dpm 

10:45 Patient visited by Clifford Almanza.                                                      dpm 

10:54 Patient name changed from Lorenzo\S\Raimundo\S\Dex\S\ to Lorenzo\S\ANNIE\S\Dex.                  
   EDMS

10:55 [facility] Payment Agreement was scanned into [employer] and attached to record.               lg  

11:01 Patient visited by Clifford Almanza.                                                      dpm 

11:19 Patient visited by Clifford Almanza.                                                      dpm 

11:20 Anjel Loomis MD is Hospitalizing Provider.                                           sd1 

11:30 Patient visited by Clifford Almanza.                                                      dpm 

11:34 EKG done. Reviewed by Charmaine Morris MD.                                         jrd 

11:47 Patient visited by Clifford Almanza.                                                      dpm 

11:47 [facility] Legal paperwork was scanned into [employer] and attached to record.                    rb  

12:07 Patient visited by Clifford Almanza.                                                      dpm 

12:15 Patient visited by Clifford Almanza.                                                      dpm 

12:49 Patient moved to D1                                                                     dpm 

                                                                                             

09:11 T-Sheet-- Draft Copy was scanned into [employer] and attached to record.                   gb  

09:12 ECG/EKG was scanned into [employer] and attached to record.                                gb  

                                                                                                  

Attachments:                                                                                      

11:47 [facility] Legal paperwork                                                                     rb  

                                                                                                  

Order Results:                                                                                    

Lab Order: Acetaminophen Level; SPEC'M 17 08:12                                             

      Test: ACETAMINOPHEN LEVEL; Value: < 2.0; Range: 10.0-30.0; Abnormal: Below low normal;      

      Units: UG/ML; Status: F                                                                     

Lab Order: Basic Metabolic Profile; SPEC'M 17 08:12                                         

      Test: GLUCOSE, FASTING; Value: 85; Range: ; Units: MG/DL; Status: F                   

      Test: BLOOD UREA NITROGEN; Value: 14; Range: 7-18; Units: MG/DL; Status: F                  

      Test: CREATININE FOR GFR; Value: 0.98; Range: 0.70-1.30; Units: MG/DL; Status: F            

      Test: GLOMERULAR FILTRATION RATE; Value: > 60.0; Range: >60; Status: F                      

      Test: SODIUM LEVEL; Value: 144; Range: 136-145; Units: MEQ/L; Status: F                     

      Test: POTASSIUM SERUM; Value: 3.9; Range: 3.5-5.1; Units: MEQ/L; Status: F                  

      Test: CHLORIDE LEVEL; Value: 108; Range: ; Abnormal: Above high normal; Units:        

      MEQ/L; Status: F                                                                            

      Test: CARBON DIOXIDE LEVEL; Value: 27; Range: 21-32; Units: MEQ/L; Status: F                

      Test: ANION GAP; Value: 9; Range: 8-16; Units: MEQ/L; Status: F                             

      Test: CALCIUM LEVEL; Value: 9.0; Range: 8.5-10.1; Units: MG/DL; Status: F                   

      Test Note: &nbsp;; Units are mL/min/1.73 m2 Chronic Kidney Disease Staging per NKF:       

      Stage I & II GFR >=60 Normal to Mildly Decreased Stage III GFR 30-59 Moderately           

      Decreased Stage IV GFR 15-29 Severely Decreased Stage V GFR <15 Very Little GFR Left        

      ESRD GFR <15 on RRT                                                                         

Lab Order: Complete Blood Count; SPEC' 17 08:12                                            

      Test: WHITE BLOOD COUNT; Value: 5.5; Range: 4.0-10.0; Units: K/mm3; Status: F               

      Test: RED BLOOD COUNT; Value: 5.37; Range: 4.30-6.10; Units: M/mm3; Status: F               

      Test: HEMOGLOBIN; Value: 16.0; Range: 14.0-18.0; Units: g/dl; Status: F                     

      Test: HEMATOCRIT; Value: 46.8; Range: 42.0-52.0; Units: %; Status: F                        

      Test: MEAN CORPUSCULAR VOLUME; Value: 87.0; Range: 80.0-96.0; Units: fl; Status: F          

      Test: MEAN CORPUSCULAR HEMOGLOBIN; Value: 29.8; Range: 27.0-33.0; Units: pg; Status: F      

      Test: MEAN CORPUSCULAR HGB CONC; Value: 34.2; Range: 32.0-36.5; Units: g/dl; Status: F      

      Test: RED CELL DISTRIBUTION WIDTH; Value: 13.6; Range: 11.5-14.5; Units: %; Status: F       

      Test: PLATELET COUNT, AUTOMATED; Value: 198; Range: 150-450; Units: k/mm3; Status: F        

Lab Order: Drug Eval Toxicology ED Only; SPEC'M 17 08:12                                    

      Test: AMPHETAMINES LEVEL URINE; Value: NEGATIVE; Range: NEGATIVE; Status: F                 

      Test: BARBITURATES URINE; Value: NEGATIVE; Range: NEGATIVE; Status: F                       

      Test: BENZODIAZEPINES URINE; Value: NEGATIVE; Range: NEGATIVE; Status: F                    

      Test: CANNABINOIDS URINE; Value: POSITIVE; Range: NEGATIVE; Abnormal: Above high            

      normal; Status: F                                                                           

      Test: COCAINE METABOLITE URINE; Value: POSITIVE; Range: NEGATIVE; Abnormal: Above high      

      normal; Status: F                                                                           

      Test: METHADONE URINE; Value: NEGATIVE; Range: NEGATIVE; Status: F                          

      Test: OPIATES URINE; Value: NEGATIVE; Range: NEGATIVE; Status: F                            

      Test: TRICYCLIC ANTIDEPRESS URINE; Value: NEGATIVE; Range: NEGATIVE; Status: F              

      Test Note: &nbsp;; FALSE POSITIVE RESULTS CAN BE CAUSED BY THE USE OF PANTOPRAZOLE        

      (PROTONIX).                                                                                 

Lab Order: Ethyl Alcohol (ethanol); SPEC'M 17 08:12                                         

      Test: ETHYL ALCOHOL (ETHANOL); Value: < 0.003; Range: 0.000-0.010; Units: %; Status: F      

Lab Order: Liver Profile; SPEC'M 17 08:12                                                   

      Test: AST/SGOT; Value: 26; Range: 15-37; Units: U/L; Status: F                              

      Test: ALT/SGPT; Value: 22; Range: 12-78; Units: U/L; Status: F                              

      Test: ALKALINE PHOSPHATASE; Value: 54; Range: ; Units: U/L; Status: F                 

      Test: BILIRUBIN,TOTAL; Value: 0.7; Range: 0.2-1.0; Units: MG/DL; Status: F                  

      Test: BILIRUBIN,DIRECT; Value: 0.2; Range: 0.0-0.2; Units: MG/DL; Status: F                 

      Test: TOTAL PROTEIN; Value: 6.6; Range: 6.4-8.2; Units: GM/DL; Status: F                    

      Test: ALBUMIN; Value: 4.0; Range: 3.2-5.2; Units: GM/DL; Status: F                          

      Test: ALBUMIN/GLOBULIN RATIO; Value: 1.54; Range: 1.00-1.93; Status: F                      

Lab Order: Salicylate Level; SPEC'M 17 08:12                                                

      Test: SALICYLATE LEVEL; Value: < 1.7; Range: 5.0-30.0; Abnormal: Below low normal;          

      Units: MG/DL; Status: F                                                                     

Lab Order: Thyroid Stimulating Hormone; SPEC'M 17 08:12                                     

      Test: THYROID STIMULATING HORMONE; Value: 1.490; Range: 0.358-3.740; Units: uIU/ML;         

      Status: F                                                                                   

Lab Order: Creatine Phosphokinase; SPEC'M 17 08:12                                          

      Test: CPK CREATINE PHOSPHOKINASE; Value: 170; Range: ; Units: U/L; Status: F          

                                                                                                  

Outcome:                                                                                          

                                                                                             

08:59 No special radiology studies were completed.                                            kr3 

11:20 Decision to Hospitalize by Provider.                                                    sd1 

12:25 Discharge Assessment: patient administered narcotics - no. The following High Risk      [facility] 

      Discharge criteria are identified: None. Admitted to Psych accompanied by tech, via         

      wheelchair, with chart. Condition: stable.                                                  

12:52 Patient left the ED.                                                                    [facility] 

                                                                                                  

Signatures:                                                                                       

Dispatcher [employer]                           Charmaine Santamaria MD MD   sd1                                                  

Alexandria Iverson RN                        RN   mcp                                                  

Maci Haley, PSA                      PSA  rb                                                   

Marysol Maxwell, Reg                  Reg  gb                                                   

Angel Hernandez, Reg                    Reg  lg                                                   

Cyndi Fang RN                       RN   kr3                                                  

Mina Calvillo RN                       RN   ml6                                                  

Clifford Almanza dpm, Joseph, KENNETH                   PCA  jrd                                                  

                                                                                                  

**************************************************************************************************



*** Chart Complete ***
MTDD

## 2017-01-28 NOTE — EDDOCDS
Nurse's Notes                                                                                     

[facility]                                                                         

Name: Lorenzo Kowalski                                                                                

Age: 31 yrs                                                                                       

Sex: Male                                                                                         

: 1985                                                                                   

MRN: [id number]                                                                                     

Arrival Date: 2017                                                                          

Time: 07:50                                                                                       

Account#: [id number]                                                                              

Bed D1                                                                                            

Private MD:                                                                                       

Diagnosis: Suicidal ideations                                                                     

                                                                                                  

Presentation:                                                                                     

                                                                                             

07:58 Presenting complaint: Patient states: states that he was hospitalized in [place] [address] 

      for cocaine abuse, seen at [facility], D/C from there states had SI with      

      plan to OD on trazadone and paxil lastnight. Mental Health Triage Level: Level 2: The       

      patient displays active suicidal ideations. Adult Sepsis Screening: The patient does        

      not have new or worsening altered mentation. Patient's respiratory rate is less than        

      22. Systolic blood pressure is greater than 100. Patient has a qSOFA score of 0-            

      Negative Sepsis Screen. Mental Health Triage Level: Level 2: The patient displays           

      active suicidal ideations. Suicide/Homicide risk assessment- The patient admits to          

      and/or has been reported to be having suicidal ideations. The patient reports that          

      he/she has been admitted to an inpatient mental health facility in the last 30 days.        

      The patient reports that he/she has a recent or current history of substance abuse. The     

      patient reports that he/she has a prior history of suicide attempt and/or organized         

      plan. The patient reports that he/she has experienced a significant life altering event     

      in the last 30 days. The patient reports that he/she lacks adequate social support. The     

      patient reports he/she has no significant chronic medical condition(s).             

      Status: Patient is not a  or  dependent. Transition of care:          

      patient was not received from another setting of care.                                      

07:58 Acuity: PEARL Level 3                                                                     ml6 

07:58 Method Of Arrival: Walkin/Carried/Asstd                                                 ml6 

07:58 Red Flag criteria, patient assessed and taken directly to a bed.                        6 

                                                                                                  

Triage Assessment:                                                                                

08:05 General: Appears in no apparent distress, Behavior is flat. Pain: Denies pain. Pt       ml6 

      Declines HIV testing. The patient is triaged at the bedside. See Assessment in Nurses       

      Notes section of ED record. Neurological: No deficits noted. Level of Consciousness is      

      awake, alert, Oriented to person, place, time,  are equal bilaterally Moves all        

      extremities. Cardiovascular: No deficits noted. Capillary refill < 3 seconds is brisk       

      in bilateral fingers toes Heart tones S1 S2 present Edema is absent. Pulses are all         

      present. Respiratory: No deficits noted. Airway is patent Respiratory effort is even,       

      unlabored, Respiratory pattern is regular, symmetrical, Breath sounds are clear             

      bilaterally. GI: No deficits noted. Abdomen is flat, non- distended Bowel sounds            

      present X 4 quads. Abd is soft and non tender X 4 quads.                                    

                                                                                                  

Historical:                                                                                       

- Allergies: no known allergies;                                                                  

- Home Meds:                                                                                      

1. Paxil Unknown Oral Unknown once daily not taking                                             

2. trazodone Unknown Oral Unknown nightly                                                       

     (Last dose: 2017 21:00)                                                                

- PMHx: Substance Abuse; Anxiety; Depression; back pain;                                          

- PSHx: none;                                                                                     

- Social history: Smoking status: Patient states was never smoker of tobacco. Patient             

uses alcohol occasionally. street drugs, cocaine, No barriers to communication noted,           

Speaks appropriately for age.                                                                   

- Family history: Not pertinent.                                                                  

- : The pt / caregiver states he / she is not on anticoagulants. Home medication list             

is obtained from the patient.                                                                   

- Exposure Risk Screening:: None identified.                                                      

                                                                                                  

                                                                                                  

Screenin:58 Screening information is obtained from the patient. Fall risk: No risks identified.     ml6 

      Assistance ADL's: requires no assistance with activities of daily living. Abuse/DV          

      Screen: The patient / caregiver reports he/she is: not in a situation that causes fear,     

      pain or injury. Nutritional screening: No deficits noted. Advance Directives:               

      Currently, there is no health care proxy. home support is adequate.                         

                                                                                                  

Assessment:                                                                                       

07:58 General: see triage assessment.                                                         ml6 

08:58 Reassessment: Patient appears in no apparent distress at this time. General: Behavior   kr3 

      is cooperative. Pain: Denies pain. Neurological: Level of Consciousness is awake,           

      alert. Respiratory: Respiratory effort is even, unlabored. Derm: Skin is normal.            

09:00 General: Appears in no apparent distress, comfortable, Behavior is appropriate for age, ml6 

      cooperative. Pain: Denies pain. Neurological: No deficits noted. Level of Consciousness     

      is awake, alert, Oriented to person, place, time. Cardiovascular: No deficits noted.        

      Capillary refill < 3 seconds is brisk in bilateral fingers toes Heart tones S1 S2           

      present. Respiratory: No deficits noted. Airway is patent Respiratory effort is even,       

      unlabored, Respiratory pattern is regular, symmetrical, Breath sounds are clear             

      bilaterally. GI: No deficits noted.                                                         

10:32 Reassessment: Patient appears in no apparent distress at this time. General: Behavior   kr3 

      is cooperative.                                                                             

12:24 General: Appears in no apparent distress, comfortable, Behavior is cooperative. Pain:   mcp 

      Denies pain. Neurological: No deficits noted. Respiratory: Airway is patent Respiratory     

      effort is even, unlabored. Derm: Skin is pink, warm & dry.                                

                                                                                                  

Mental Health Eval:                                                                               

09:57 Mental health consult is initiated at 09:15.  Status: The patient is not a      rb  

       or  dependent. [facility]: The patient is not an          

      established patient of [facility]. Referral Information: Evaluation referral     

      is generated by the patient himself / herself. The patient was referred for evaluation      

      because Pt presented to ED after stopping his meds yesterday (do to ?? side effects,        

      "stopped working"), had increased anxiety and depression. Pt reported threw away his        

      meds early this morning because had thoughts of +SI by overdose on meds. Pt stated          

      increased anxiety and anger; smashed his own phone on the sidewalk this morning on way      

      to [facility] ED. Pt reported left [facility] yesterday after 7 days there do to           

      increased anxiety. Pt stated "emotions flood by end of day". . Subjective: The patients     

      chief complaint is increased depression and anxiety, +SI by overdose, substance abuse..     

      Delusions are denied. Patient's mood is anxious, dysphoric, hopeless, Hallucinations        

      are denied. Mental Health history: depression, abusing marijuana. crack cocaine. panic      

      attacks, sleep disturbance, suicide attempt by hanging at age 16 while in longterm. Pt          

      stated made a noose out of a sheet to hang himself, was stopped by the Guard. Mental        

      Health Admissions: None. Current Outpatient Mental Health Services: Therapist / Agency:     

      [facility]. Pt stated stopped going last .. PCP Dr. Thomas in [place] for            

      medication management. . Current living environment is The patient currently lives with     

      his / her father, ,. Patient presents to Emergency Department with the following            

      symptoms within the past 2 weeks: anger, antisocial behavior, anxiety, depressed mood,      

      drug abuse, feelings of helplessness/hopelessness, non-compliance, panic attacks, poor      

      impulse control, sleep disturbance - insomnia, suicidal ideation with plan for pills.       

      Substance abuse: Patient uses cocaine, Last use was 15 hours ago. Patient uses              

      marijuana daily. Mental status exam: Patients appearance is disheveled Patient's            

      behavior is cooperative, minimally responsive Speech is mumbled. slow. Affect is flat.      

      Mood is anxious. dysphoric. Hallucinations are denied. Appetite is normal. Memory is        

      good. Energy level is lethargic. Content of thought is depressive. , Thought process is     

      characterized by flight of ideas. Cognitive level is oriented to person, place, time        

      and situation Patient's insight is poor. Judgement is fair. Rapport with interviewer is     

      good. Suicidal Ideation present with a plan to kill self by pills. Homicidal ideation       

      is not present. Disposition: Medically cleared for disposition by Charmaine Morris MD Psychiatric Consult is performed by phone with Dr Anjel Loomis MD. [facility] Admission       

      Criteria: The patient is experiencing suicidal ideation. The patient displays symptoms      

      of severe psychiatric disorder resulting in disordered behavior and significant             

      interference with his / her ability to maintain self care. Severe Anxiety. The patient      

      requires continuous observation and/or control to protect self, others or property. The     

      patient's care requires a multi-modal treatment plan under close supervision and            

      coordination due to the complexity and severity of the patient's symptoms. Legal            

      Status: Patient's legal status will be Emergency admission: 9.39. [place] Safe [place]: [place]     

      Safe Act is applicable to this patient. The patient poses a risk to self or other and       

      the Nursing Supervisor has been notified. He/She will enter the patient's data.             

11:08 Pt states preferred pharmacy is: [facility] in [facility] and [facility] in [place] .      rb  

11:44 DSM-V Differential Diagnosis: Major Depressive Disorder unspecified (F33.9). Awaiting:  rb  

      transfer to [facility].                                                                           

14:39 Insurance Pre-Certification: approved by: Deb\TATO\[facility] for 5 days; - and review rb  

      on 2017 with [place] #[phone]. [phone]. Auth# [id number]..                          

                                                                                                  

Vital Signs:                                                                                      

08:06 Resp 16; Weight 81.65 kg (R); Height 6 ft. 3 in. (190.50 cm) (R); Pain 0/10;            ml6 

08:16  / 77; Pulse 58; Resp 16; Temp 97.7(O); Pulse Ox 94% on R/A;                      dpm 

11:48  / 77; Pulse 67; Resp 16; Temp 97.5(O); Pulse Ox 95% on R/A;                      dpm 

08:06 Body Mass Index 22.50 (81.65 kg, 190.50 cm)                                             ml6 

                                                                                                  

Vitals:                                                                                           

07:54 Log In Time: 2017 at 07:51.                                                 ml6 

                                                                                                  

ED Course:                                                                                        

07:51 Patient visited by Angel Hernandez Reg.                                                lg  

07:51 Patient moved to [facility]  

07:52 Patient moved to [facility]                                                                   ml6 

07:59 Charmaine Morris MD is Attending Physician.                                      sd1 

07:59 Patient visited by Charmaine Morris MD.                                          sd1 

08:03 Triage Initiated                                                                        ml6 

08:19 Patient visited by Clifford Almanza.                                                      dpm 

08:19 Pt greeted and oriented to ED. Patient advised of names of staff involved in care,      dpm 

      location of call bell, wait times and NPO status. Patient has correct armband on for        

      positive identification. Placed in gown. Placed in psych safe attire. Security              

      observing. Property removed, inventory done, secured in belongings bag- placed in           

      locked locker. Placed in locker 3. Psych Safety Check: Location: Psych Room. Visual         

      Assessment: Cooperative.                                                                    

08:22 Acetaminophen Level Sent.                                                               dpm 

08:22 Basic Metabolic Profile Sent.                                                           dpm 

08:22 Complete Blood Count Sent.                                                              dpm 

08:22 Drug Eval Toxicology ED Only Sent.                                                      dpm 

08:22 Ethyl Alcohol (ethanol) Sent.                                                           dpm 

08:22 Liver Profile Sent.                                                                     dpm 

08:22 Salicylate Level Sent.                                                                  dpm 

08:22 Thyroid Stimulating Hormone Sent.                                                       dpm 

08:30 Patient visited by Clifford Almanza.                                                      dpm 

08:43 Patient visited by Clifford Almanza.                                                      dpm 

08:57 Patient visited by Clifford Almanza.                                                      dpm 

08:59 The patient / caregiver is instructed regarding the plan of care and ED course.         kr3 

08:59 Diet tray given.                                                                        kr3 

08:59 No IV's were initiated during this patient's visit. No procedures done that require     kr3 

      assistance.                                                                                 

09:12 Patient visited by Clifford Almanza.                                                      dpm 

09:31 Patient visited by Clifford Almanza.                                                      dpm 

09:51 Patient visited by Clifford Almanza.                                                      dpm 

10:06 Patient visited by Clifford Almanza.                                                      dpm 

10:18 Patient visited by Clifford Almanza.                                                      dpm 

10:33 Patient visited by Clifford Almanza.                                                      dpm 

10:45 Patient visited by Clifford Almanza.                                                      dpm 

10:54 Patient name changed from Lorenzo\S\Raimundo\S\Dex\S\ to Lorenzo\S\ANNIE\S\Dex.                  
   EDMS

10:55 [facility] Payment Agreement was scanned into makerist and attached to record.               lg  

11:01 Patient visited by Clifford Almanza.                                                      dpm 

11:19 Patient visited by Clifford Almanza.                                                      dpm 

11:20 Anjel Loomis MD is Hospitalizing Provider.                                           sd1 

11:30 Patient visited by Clifford Almanza.                                                      dpm 

11:34 EKG done. Reviewed by Charmaine Morris MD.                                         jrd 

11:47 Patient visited by Clifford Almanza.                                                      dpm 

11:47 [facility] Legal paperwork was scanned into makerist and attached to record.                    rb  

12:07 Patient visited by Clifford Almanza.                                                      dpm 

12:15 Patient visited by Clifford Almanza.                                                      dpm 

12:49 Patient moved to D1                                                                     dpm 

                                                                                             

09:11 T-Sheet-- Draft Copy was scanned into makerist and attached to record.                   gb  

09:12 ECG/EKG was scanned into makerist and attached to record.                                gb  

                                                                                                  

Attachments:                                                                                      

11:47 [facility] Legal paperwork                                                                     rb  

                                                                                                  

Order Results:                                                                                    

Lab Order: Acetaminophen Level; SPEC'M 17 08:12                                             

      Test: ACETAMINOPHEN LEVEL; Value: < 2.0; Range: 10.0-30.0; Abnormal: Below low normal;      

      Units: UG/ML; Status: F                                                                     

Lab Order: Basic Metabolic Profile; SPEC'M 17 08:12                                         

      Test: GLUCOSE, FASTING; Value: 85; Range: ; Units: MG/DL; Status: F                   

      Test: BLOOD UREA NITROGEN; Value: 14; Range: 7-18; Units: MG/DL; Status: F                  

      Test: CREATININE FOR GFR; Value: 0.98; Range: 0.70-1.30; Units: MG/DL; Status: F            

      Test: GLOMERULAR FILTRATION RATE; Value: > 60.0; Range: >60; Status: F                      

      Test: SODIUM LEVEL; Value: 144; Range: 136-145; Units: MEQ/L; Status: F                     

      Test: POTASSIUM SERUM; Value: 3.9; Range: 3.5-5.1; Units: MEQ/L; Status: F                  

      Test: CHLORIDE LEVEL; Value: 108; Range: ; Abnormal: Above high normal; Units:        

      MEQ/L; Status: F                                                                            

      Test: CARBON DIOXIDE LEVEL; Value: 27; Range: 21-32; Units: MEQ/L; Status: F                

      Test: ANION GAP; Value: 9; Range: 8-16; Units: MEQ/L; Status: F                             

      Test: CALCIUM LEVEL; Value: 9.0; Range: 8.5-10.1; Units: MG/DL; Status: F                   

      Test Note: &nbsp;; Units are mL/min/1.73 m2 Chronic Kidney Disease Staging per NKF:       

      Stage I & II GFR >=60 Normal to Mildly Decreased Stage III GFR 30-59 Moderately           

      Decreased Stage IV GFR 15-29 Severely Decreased Stage V GFR <15 Very Little GFR Left        

      ESRD GFR <15 on RRT                                                                         

Lab Order: Complete Blood Count; SPEC' 17 08:12                                            

      Test: WHITE BLOOD COUNT; Value: 5.5; Range: 4.0-10.0; Units: K/mm3; Status: F               

      Test: RED BLOOD COUNT; Value: 5.37; Range: 4.30-6.10; Units: M/mm3; Status: F               

      Test: HEMOGLOBIN; Value: 16.0; Range: 14.0-18.0; Units: g/dl; Status: F                     

      Test: HEMATOCRIT; Value: 46.8; Range: 42.0-52.0; Units: %; Status: F                        

      Test: MEAN CORPUSCULAR VOLUME; Value: 87.0; Range: 80.0-96.0; Units: fl; Status: F          

      Test: MEAN CORPUSCULAR HEMOGLOBIN; Value: 29.8; Range: 27.0-33.0; Units: pg; Status: F      

      Test: MEAN CORPUSCULAR HGB CONC; Value: 34.2; Range: 32.0-36.5; Units: g/dl; Status: F      

      Test: RED CELL DISTRIBUTION WIDTH; Value: 13.6; Range: 11.5-14.5; Units: %; Status: F       

      Test: PLATELET COUNT, AUTOMATED; Value: 198; Range: 150-450; Units: k/mm3; Status: F        

Lab Order: Drug Eval Toxicology ED Only; SPEC'M 17 08:12                                    

      Test: AMPHETAMINES LEVEL URINE; Value: NEGATIVE; Range: NEGATIVE; Status: F                 

      Test: BARBITURATES URINE; Value: NEGATIVE; Range: NEGATIVE; Status: F                       

      Test: BENZODIAZEPINES URINE; Value: NEGATIVE; Range: NEGATIVE; Status: F                    

      Test: CANNABINOIDS URINE; Value: POSITIVE; Range: NEGATIVE; Abnormal: Above high            

      normal; Status: F                                                                           

      Test: COCAINE METABOLITE URINE; Value: POSITIVE; Range: NEGATIVE; Abnormal: Above high      

      normal; Status: F                                                                           

      Test: METHADONE URINE; Value: NEGATIVE; Range: NEGATIVE; Status: F                          

      Test: OPIATES URINE; Value: NEGATIVE; Range: NEGATIVE; Status: F                            

      Test: TRICYCLIC ANTIDEPRESS URINE; Value: NEGATIVE; Range: NEGATIVE; Status: F              

      Test Note: &nbsp;; FALSE POSITIVE RESULTS CAN BE CAUSED BY THE USE OF PANTOPRAZOLE        

      (PROTONIX).                                                                                 

Lab Order: Ethyl Alcohol (ethanol); SPEC'M 17 08:12                                         

      Test: ETHYL ALCOHOL (ETHANOL); Value: < 0.003; Range: 0.000-0.010; Units: %; Status: F      

Lab Order: Liver Profile; SPEC'M 17 08:12                                                   

      Test: AST/SGOT; Value: 26; Range: 15-37; Units: U/L; Status: F                              

      Test: ALT/SGPT; Value: 22; Range: 12-78; Units: U/L; Status: F                              

      Test: ALKALINE PHOSPHATASE; Value: 54; Range: ; Units: U/L; Status: F                 

      Test: BILIRUBIN,TOTAL; Value: 0.7; Range: 0.2-1.0; Units: MG/DL; Status: F                  

      Test: BILIRUBIN,DIRECT; Value: 0.2; Range: 0.0-0.2; Units: MG/DL; Status: F                 

      Test: TOTAL PROTEIN; Value: 6.6; Range: 6.4-8.2; Units: GM/DL; Status: F                    

      Test: ALBUMIN; Value: 4.0; Range: 3.2-5.2; Units: GM/DL; Status: F                          

      Test: ALBUMIN/GLOBULIN RATIO; Value: 1.54; Range: 1.00-1.93; Status: F                      

Lab Order: Salicylate Level; SPEC'M 17 08:12                                                

      Test: SALICYLATE LEVEL; Value: < 1.7; Range: 5.0-30.0; Abnormal: Below low normal;          

      Units: MG/DL; Status: F                                                                     

Lab Order: Thyroid Stimulating Hormone; SPEC'M 17 08:12                                     

      Test: THYROID STIMULATING HORMONE; Value: 1.490; Range: 0.358-3.740; Units: uIU/ML;         

      Status: F                                                                                   

Lab Order: Creatine Phosphokinase; SPEC'M 17 08:12                                          

      Test: CPK CREATINE PHOSPHOKINASE; Value: 170; Range: ; Units: U/L; Status: F          

                                                                                                  

Outcome:                                                                                          

                                                                                             

08:59 No special radiology studies were completed.                                            kr3 

11:20 Decision to Hospitalize by Provider.                                                    sd1 

12:25 Discharge Assessment: patient administered narcotics - no. The following High Risk      [facility] 

      Discharge criteria are identified: None. Admitted to Psych accompanied by tech, via         

      wheelchair, with chart. Condition: stable.                                                  

12:52 Patient left the ED.                                                                    [facility] 

                                                                                                  

Signatures:                                                                                       

Dispatcher [employer]                           Charmaine Santamaria MD MD   sd1                                                  

Alexandria Iverson RN                        RN   mcp                                                  

Maci Haley, PSA                      PSA  rb                                                   

Marysol Maxwell, Reg                  Reg  gb                                                   

Angel Hernandez, Reg                    Reg  lg                                                   

Cyndi Fang RN                       RN   kr3                                                  

Mina Calvillo RN                       RN   ml6                                                  

Clifford Almanza dpm, Joseph, KENNETH                   PCA  jrd                                                  

                                                                                                  

**************************************************************************************************



*** Chart Complete ***
MTDD

## 2017-01-28 NOTE — EDDOCDS
Physician Documentation                                                                           

[facility]                                                                         

Name: Lorenzo Kowalski                                                                                

Age: 31 yrs                                                                                       

Sex: Male                                                                                         

: 1985                                                                                   

MRN: [id number]                                                                                     

Arrival Date: 2017                                                                          

Time: 07:50                                                                                       

Account#: [id number]                                                                              

Bed D1                                                                                            

Private MD:                                                                                       

Disposition:                                                                                      

17 11:20 Hospitalization ordered by Anjel Loomis for Inpatient Admission. Preliminary     

diagnosis is Suicidal ideations.                                                                

- Bed requested for Admit.                                                                        

- Status is Inpatient Admission.                                                              mcp 

- Condition is Stable.                                                                            

- Problem is new.                                                                                 

- Symptoms are unchanged.                                                                         

                                                                                                  

                                                                                                  

                                                                                                  

Historical:                                                                                       

- Allergies: no known allergies;                                                                  

- Home Meds:                                                                                      

1. Paxil Unknown Oral Unknown once daily not taking                                             

2. trazodone Unknown Oral Unknown nightly                                                       

     (Last dose: 2017 21:00)                                                                

- PMHx: Substance Abuse; Anxiety; Depression; back pain;                                          

- PSHx: none;                                                                                     

- Social history: Smoking status: Patient states was never smoker of tobacco. Patient             

uses alcohol occasionally. street drugs, cocaine, No barriers to communication noted,           

Speaks appropriately for age.                                                                   

- Family history: Not pertinent.                                                                  

- : The pt / caregiver states he / she is not on anticoagulants. Home medication list             

is obtained from the patient.                                                                   

- Exposure Risk Screening:: None identified.                                                      

                                                                                                  

                                                                                                  

Vital Signs:                                                                                      

                                                                                             

08:06 Resp 16; Weight 81.65 kg / 180.01 lbs (R); Height 6 ft. 3 in. (190.50 cm) (R); Pain     ml6 

      0/10;                                                                                       

08:16  / 77; Pulse 58; Resp 16; Temp 97.7(O); Pulse Ox 94% on R/A;                      dpm 

11:48  / 77; Pulse 67; Resp 16; Temp 97.5(O); Pulse Ox 95% on R/A;                      dpm 

08:06 Body Mass Index 22.50 (81.65 kg, 190.50 cm)                                             ml6 

                                                                                                  

MDM:                                                                                              

07:59 Consult PFS/PSA/ ordered.                                                  sd1 

07:59 Consult PFS/PSA/: Patient's case requires discussion with on-call          sd1 

      Psychiatrist ordered.                                                                       

07:59 PSA/PFS to call Nursing Supervisor, to enter patient data on [place] Safe [place] if patient    sd1 

      involuntarily admitted or transferred for SI or HI ordered.                                 

07:59 Confirm accurate psychiatric medication list and times of last dosage ordered.          sd1 

07:59 Detain Pt Until Medically/PFS Cleared ordered.                                          sd1 

08:00 Acetaminophen Level Ordered.                                                            EDMS

08:00 Basic Metabolic Profile Ordered.                                                        EDMS

08:00 Complete Blood Count Ordered.                                                           EDMS

08:00 Drug Eval Toxicology ED Only Ordered.                                                   EDMS

08:00 Ethyl Alcohol (ethanol) Ordered.                                                        EDMS

08:00 Liver Profile Ordered.                                                                  EDMS

08:00 Salicylate Level Ordered.                                                               EDMS

08:00 Thyroid Stimulating Hormone Ordered.                                                    EDMS

08:01 Creatine Phosphokinase Ordered.                                                         EDMS

08:01 ECG WITH READING ER PHYS+CARDIAG ordered.                                               EDMS

08:09 BED REQUEST+ADM ordered.                                                                EDMS

08:14 REGULAR DIET PLASTIC WARE+DIET ordered.                                                 EDMS

09:00 Acetaminophen Level Reviewed.                                                           sd1 

09:00 Basic Metabolic Profile Reviewed.                                                       sd1 

09:00 Drug Eval Toxicology ED Only Reviewed.                                                  sd1 

09:00 Salicylate Level Reviewed.                                                              sd1 

09:00 Complete Blood Count Reviewed.                                                          sd1 

09:00 Ethyl Alcohol (ethanol) Reviewed.                                                       sd1 

09:00 Liver Profile Reviewed.                                                                 sd1 

09:00 Thyroid Stimulating Hormone Reviewed.                                                   sd1 

09:00 Creatine Phosphokinase Reviewed.                                                        sd1 

09:37 Consult PFS/PSA/ complete.                                                 rb  

10:00 Financial registration complete.                                                        lg  

10:55 [facility] Payment Agreement was scanned into [employer] and attached to record.               lg  

11:20 REGULAR DIET PLASTIC WARE+DIET ordered.                                                 EDMS

11:22 Admit to [facility]: ordered.                                                                 EDMS

11:47 [facility] Legal paperwork was scanned into [employer] and attached to record.                    rb  

11:57 Consult PFS/PSA/: Patient's case requires discussion with on-call          rb  

      Psychiatrist complete.                                                                      

11:57 PSA/PFS to call Nursing Supervisor, to [place] Safe [place] if patient    rb  

      involuntarily admitted or transferred for SI or HI complete.                                

                                                                                             

09:11 T-Sheet-- Draft Copy was scanned into [employer] and attached to record.                   gb  

09:12 ECG/EKG was scanned into [employer] and attached to record.                                gb  

                                                                                                  

Signatures:                                                                                       

Dispatcher [employer]                           EDMS                                                 

Charmaine Morris MD MD sd1                                                  

Alexandria Iverson RN                        RN   mcp                                                  

Maci Haley, PSA                      PSA  rb                                                   

Marysol Maxwell, Reg                  Reg  gb                                                   

Angel Hernandez, Reg                    Reg  lg                                                   

Leoncio,Cyndi,RN                       RN   kr3                                                  

Mina Calvillo, RN                       RN   ml6                                                  

                                                                                                  

The chart was reviewed and I authenticate all verbal orders and agree with the evaluation and 
treatment provided.Attachments:

                                                                                             

10:55 [facility] Payment Agreement                                                                lg  

                                                                                             

09:11 T-Sheet-- Draft Copy                                                                    gb  

09:12 ECG/EKG                                                                                 gb  

                                                                                                  

**************************************************************************************************



*** Chart Complete ***
MTDD

## 2017-01-28 NOTE — EDDOCDS
Physician Documentation                                                                           

                                                                         

Name: Lorenzo Kowalski                                                                                

Age: 31 yrs                                                                                       

Sex: Male                                                                                         

: 1985                                                                                   

MRN: [id number]                                                                                     

Arrival Date: 2017                                                                          

Time: 07:50                                                                                       

Account#: [id number]                                                                              

Bed D1                                                                                            

Private MD:                                                                                       

Disposition:                                                                                      

17 11:20 Hospitalization ordered by Anjel Loomis for Inpatient Admission. Preliminary     

diagnosis is Suicidal ideations.                                                                

- Bed requested for Admit.                                                                        

- Status is Inpatient Admission.                                                              mcp 

- Condition is Stable.                                                                            

- Problem is new.                                                                                 

- Symptoms are unchanged.                                                                         

                                                                                                  

                                                                                                  

                                                                                                  

Historical:                                                                                       

- Allergies: no known allergies;                                                                  

- Home Meds:                                                                                      

1. Paxil Unknown Oral Unknown once daily not taking                                             

2. trazodone Unknown Oral Unknown nightly                                                       

     (Last dose: 2017 21:00)                                                                

- PMHx: Substance Abuse; Anxiety; Depression; back pain;                                          

- PSHx: none;                                                                                     

- Social history: Smoking status: Patient states was never smoker of tobacco. Patient             

uses alcohol occasionally. street drugs, cocaine, No barriers to communication noted,           

Speaks appropriately for age.                                                                   

- Family history: Not pertinent.                                                                  

- : The pt / caregiver states he / she is not on anticoagulants. Home medication list             

is obtained from the patient.                                                                   

- Exposure Risk Screening:: None identified.                                                      

                                                                                                  

                                                                                                  

Vital Signs:                                                                                      

                                                                                             

08:06 Resp 16; Weight 81.65 kg / 180.01 lbs (R); Height 6 ft. 3 in. (190.50 cm) (R); Pain     ml6 

      0/10;                                                                                       

08:16  / 77; Pulse 58; Resp 16; Temp 97.7(O); Pulse Ox 94% on R/A;                      dpm 

11:48  / 77; Pulse 67; Resp 16; Temp 97.5(O); Pulse Ox 95% on R/A;                      dpm 

08:06 Body Mass Index 22.50 (81.65 kg, 190.50 cm)                                             ml6 

                                                                                                  

MDM:                                                                                              

07:59 Consult PFS/PSA/ ordered.                                                  sd1 

07:59 Consult PFS/PSA/: Patient's case requires discussion with on-call          sd1 

      Psychiatrist ordered.                                                                       

07:59 PSA/PFS to call Nursing Supervisor, to enter patient data [place] Safe [place] if patient    sd1 

      involuntarily admitted or transferred for SI or HI ordered.                                 

07:59 Confirm accurate psychiatric medication list and times of last dosage ordered.          sd1 

07:59 Detain Pt Until Medically/PFS Cleared ordered.                                          sd1 

08:00 Acetaminophen Level Ordered.                                                            EDMS

08:00 Basic Metabolic Profile Ordered.                                                        EDMS

08:00 Complete Blood Count Ordered.                                                           EDMS

08:00 Drug Eval Toxicology ED Only Ordered.                                                   EDMS

08:00 Ethyl Alcohol (ethanol) Ordered.                                                        EDMS

08:00 Liver Profile Ordered.                                                                  EDMS

08:00 Salicylate Level Ordered.                                                               EDMS

08:00 Thyroid Stimulating Hormone Ordered.                                                    EDMS

08:01 Creatine Phosphokinase Ordered.                                                         EDMS

08:01 ECG WITH READING ER PHYS+CARDIAG ordered.                                               EDMS

08:09 BED REQUEST+ADM ordered.                                                                EDMS

08:14 REGULAR DIET PLASTIC WARE+DIET ordered.                                                 EDMS

09:00 Acetaminophen Level Reviewed.                                                           sd1 

09:00 Basic Metabolic Profile Reviewed.                                                       sd1 

09:00 Drug Eval Toxicology ED Only Reviewed.                                                  sd1 

09:00 Salicylate Level Reviewed.                                                              sd1 

09:00 Complete Blood Count Reviewed.                                                          sd1 

09:00 Ethyl Alcohol (ethanol) Reviewed.                                                       sd1 

09:00 Liver Profile Reviewed.                                                                 sd1 

09:00 Thyroid Stimulating Hormone Reviewed.                                                   sd1 

09:00 Creatine Phosphokinase Reviewed.                                                        sd1 

09:37 Consult PFS/PSA/ complete.                                                 rb  

10:00 Financial registration complete.                                                        lg  

10:55 [facility] Payment Agreement was scanned into [employer] and attached to record.               lg  

11:20 REGULAR DIET PLASTIC WARE+DIET ordered.                                                 EDMS

11:22 Admit to [facility]: ordered.                                                                 EDMS

11:47 [facility] Legal paperwork was scanned into [employer] and attached to record.                    rb  

11:57 Consult PFS/PSA/: Patient's case requires discussion with on-call          rb  

      Psychiatrist complete.                                                                      

11:57 PSA/PFS to call Nursing Supervisor, to [place] if patient    rb  

      involuntarily admitted or transferred for SI or HI complete.                                

                                                                                             

09:11 T-Sheet-- Draft Copy was scanned into [employer] and attached to record.                   gb  

09:12 ECG/EKG was scanned into [employer] and attached to record.                                gb  

                                                                                                  

Signatures:                                                                                       

Dispatcher [employer]                           EDMS                                                 

Charmaine Morris MD MD sd1                                                  

Alexandria Iverson RN                        RN   mcp                                                  

Maci Haley, PSA                      PSA  rb                                                   

Marysol Maxwell, Reg                  Reg  gb                                                   

Angel Hernandez, Reg                    Reg  lg                                                   

Leoncio,Cyndi,RN                       RN   kr3                                                  

Mina Calvillo, RN                       RN   ml6                                                  

                                                                                                  

The chart was reviewed and I authenticate all verbal orders and agree with the evaluation and 
treatment provided.Attachments:

                                                                                             

10:55 [facility] Payment Agreement                                                                lg  

                                                                                             

09:11 T-Sheet-- Draft Copy                                                                    gb  

09:12 ECG/EKG                                                                                 gb  

                                                                                                  

**************************************************************************************************



*** Chart Complete ***
MTDD

## 2017-01-29 VITALS — SYSTOLIC BLOOD PRESSURE: 107 MMHG | DIASTOLIC BLOOD PRESSURE: 58 MMHG

## 2017-01-29 VITALS — SYSTOLIC BLOOD PRESSURE: 144 MMHG | DIASTOLIC BLOOD PRESSURE: 78 MMHG

## 2017-01-29 RX ADMIN — IBUPROFEN PRN MG: 400 TABLET, FILM COATED ORAL at 11:45

## 2017-01-29 RX ADMIN — IBUPROFEN PRN MG: 400 TABLET, FILM COATED ORAL at 19:41

## 2017-01-29 NOTE — IPNPDOC
[facility] Progress Note


Progress Note


DATE OF SERVICE: 1/29/17





SUBJECTIVE: 


--------------------


Patient reports his mood as okay this morning. Sleep continues to improve. 

Patient continues to be restrictive in disclosing the nature of his presenting 

symptoms.


He has expressed no bizarre or inappropriate thoughts. He is social in the 

milieu. He has not been inappropriate or bizarre in behavior. Patient declines 

offer to start antidepressants currently. Patient reports he would like to 

pursue psychotherapy once discharged. Patient denies SI/HI and denies AH/VH.


 





OBJECTIVE:


--------------- 


VITAL SIGNS: See below.


 


CURRENT MEDICATIONS: See below.


 


MENTAL STATUS EXAMINATION: Patient is a [30yo  male ], [looks stated 

age and in no acute distress.].


Speech: Is [RRR and spontaneous].


Thought processes: [Clear, linear and Goal directed].


Rate of thoughts: [Appropriate].


Thought content: [focused on discharge].


Abstract reasoning: [Intact].


Associations: [ Intact]. 


Abnormal or psychotic thoughts: [No perceptual issues noted]


Judgment: [poor]


Insight: [poor]


Oriented to: [Time, place and person.]


Recent and Remote Memory: [Immediate, short-term and long-term memory is intact]

.


Attention Span and Concentration: [ Good].


Fund of knowledge: [ Good].


Mood: [ depressed].


Affect: [depressed ].











ASSESSMENT:


-------------------


1. MDD, R


2. Cocaine use d/o


3. Cannabis use d/o





PLAN:


----------


1. Patient declines offer to start antidepressants currently.


2. Patient reports he would like to pursue psychotherapy once discharged.





TIME SPENT: [30] minutes.








Vital Signs





 Vital Signs








  Date Time  Temp Pulse Resp B/P Pulse Ox O2 Delivery O2 Flow Rate FiO2


 


1/29/17 18:00 97.6 60 16 144/78    


 


1/26/17 13:16     96 Room Air  











Current Medications





 Current Medications








 Medications


  (Trade)  Dose


 Ordered  Sig/Catherine


 Route


 PRN Reason  Start Time


 Stop Time Status Last Admin


Dose Admin


 


 Al Hydrox/Mg


 Hydrox/Simethicone


  (Mylanta)  30 ml  Q4HP  PRN


 PO


 HEARTBURN/INDIGESTION  1/26/17 17:45


 2/25/17 17:44   


 


 


 Home Med


  (Med Rec


 Complete!)    ASDIRECTED


 XX


   1/26/17 08:30


 1/26/17 08:35 DC  


 


 


 Hydroxyzine HCl


  (Atarax)  50 mg  Q6HP  PRN


 PO


 ANXIETY  1/26/17 17:45


 2/25/17 17:44  1/28/17 13:37


 


 


 Ibuprofen


  (Advil)  400 mg  Q6HP  PRN


 PO


 PAIN  1/26/17 17:45


 2/25/17 17:44  1/29/17 19:41


 


 


 Magnesium


 Hydroxide


  (Milk Of


 Magnesia)  30 ml  DAILYPRN  PRN


 PO


 CONSTIPATION  1/26/17 17:45


 2/25/17 17:44   


 


 


 Trazodone HCl


  (Desyrel)  150 mg  QHSP  PRN


 PO


 INSOMNIA  1/26/17 20:45


 2/25/17 20:44  1/27/17 21:24


 











Allergies


Coded Allergies:  


     Bee Venom (Unverified  Allergy, Mild, 1/26/17)


     No Known Drug Allergy (Unverified  Allergy, Unknown, 1/26/17)








BASSEM MONTANO MD Jan 29, 2017 23:36

## 2017-01-29 NOTE — IPNPDOC
[facility] Progress Note


Progress Note


DATE OF SERVICE: 1/28/17





SUBJECTIVE: 


--------------------


Patient reports his mood as better this morning. Patient reports making odd and 

inappropriate statements per his family which prompted his leaving [facility] rehabilitation and self presenting to [facility] and subsequent 

transfer to [facility]. Patient will not elaborate on the details of 

these inappropriate statements. Patient reports his mother told him to lie that 

he had suicidal ideations so that he would be kept in the hospital. He 

currently is focused on going home. He will not elaborate on why he felt he had 

to lie to be admitted or his abnormal thoughts and statements. Patient reports 

sleeping has improved. Patient reports appetite is within normal limits.





Patient denies headache, chest pain, abdominal pain and reports urination and 

bowel movements are within normal limits.  Patient declines offer to start 

antidepressants currently. Patient reports he would like to pursue 

psychotherapy once discharged. Patient denies SI/HI and 


denies AH/VH.


 





OBJECTIVE:


--------------- 


VITAL SIGNS: See below.


 


CURRENT MEDICATIONS: See below.


 


MENTAL STATUS EXAMINATION: Patient is a [30yo  male ], [looks stated 

age and in no acute distress.].


Speech: Is [RRR and spontaneous].


Thought processes: [Clear, linear and Goal directed].


Rate of thoughts: [Appropriate].


Thought content: [focused on discharge, expresses desire for psychotherapy 

instead of pharmacotherapy].


Abstract reasoning: [Intact].


Associations: [ Intact]. 


Abnormal or psychotic thoughts: [No perceptual issues noted]


Judgment: [poor]


Insight: [poor]


Oriented to: [Time, place and person.]


Recent and Remote Memory: [Immediate, short-term and long-term memory is intact]

.


Attention Span and Concentration: [ Good].


Fund of knowledge: [ Good].


Mood: [ depressed].


Affect: [depressed ].











ASSESSMENT:


-------------------


1. MDD, R


2. Cocaine use d/o


3. Cannabis use d/o





PLAN:


----------


1. Patient declines offer to start antidepressants currently.


2. Patient reports he would like to pursue psychotherapy once discharged.





TIME SPENT: [30] minutes.








Vital Signs





 Vital Signs








  Date Time  Temp Pulse Resp B/P Pulse Ox O2 Delivery O2 Flow Rate FiO2


 


1/29/17 18:00 97.6 60 16 144/78    


 


1/26/17 13:16     96 Room Air  











Current Medications





 Current Medications








 Medications


  (Trade)  Dose


 Ordered  Sig/Catherine


 Route


 PRN Reason  Start Time


 Stop Time Status Last Admin


Dose Admin


 


 Al Hydrox/Mg


 Hydrox/Simethicone


  (Mylanta)  30 ml  Q4HP  PRN


 PO


 HEARTBURN/INDIGESTION  1/26/17 17:45


 2/25/17 17:44   


 


 


 Home Med


  (Med Rec


 Complete!)    ASDIRECTED


 XX


   1/26/17 08:30


 1/26/17 08:35 DC  


 


 


 Hydroxyzine HCl


  (Atarax)  50 mg  Q6HP  PRN


 PO


 ANXIETY  1/26/17 17:45


 2/25/17 17:44  1/28/17 13:37


 


 


 Ibuprofen


  (Advil)  400 mg  Q6HP  PRN


 PO


 PAIN  1/26/17 17:45


 2/25/17 17:44  1/29/17 19:41


 


 


 Magnesium


 Hydroxide


  (Milk Of


 Magnesia)  30 ml  DAILYPRN  PRN


 PO


 CONSTIPATION  1/26/17 17:45


 2/25/17 17:44   


 


 


 Trazodone HCl


  (Desyrel)  150 mg  QHSP  PRN


 PO


 INSOMNIA  1/26/17 20:45


 2/25/17 20:44  1/27/17 21:24


 











Allergies


Coded Allergies:  


     Bee Venom (Unverified  Allergy, Mild, 1/26/17)


     No Known Drug Allergy (Unverified  Allergy, Unknown, 1/26/17)








BASSEM MONTANO MD Jan 29, 2017 23:35

## 2017-01-30 VITALS — SYSTOLIC BLOOD PRESSURE: 104 MMHG | DIASTOLIC BLOOD PRESSURE: 56 MMHG

## 2017-01-30 NOTE — DS.PDOC
[facility] Discharge Summary


Discharge Summary


DATE OF ADMISSION: Jan 26, 2017 at 12:35 


DATE OF DISCHARGE: Jan 30, 2017





DISCHARGE DIAGNOSES: Major Depressive disorder, recurrent, Cocaine use disorder

, Marijuana use disorder. 





REASON FOR ADMISSION: "I think it was a reaction to my meds". Pt. states he had 

suicidal thoughts of overdosing on his meds. Pt. states situational stressors 

"Which I don't want to get in to" also aggravated the potential med reaction. 

Pt. does not want to talk about the fact he was in residential rehab for 

cocaine use disorder.  Pt. also feels his lack of sleep made his depression and 

anxiety symptoms worse as well. 





TREATMENT AND PROGRESS ON THE UNIT:  Pt. is a tall, slender male in an apparent 

manic state, probably substance induced. Pt. disregards and refuses to consider 

any medication recommendations to help with his depression or anxiety. Pt. 

tells provider that he is "Just going to use my pot, not going to stop no 

matter what anyone says". Pt. does not want to answer with more than a "Yes or 

No". When provider asks specifics, pt. states "I don't want to get into that". 

Pt. is vague and purposefully not specific. Pt. is not agreeing to any 

treatment options, does not attend or participate in groups on the unit. Pt. 

refuses to even consider any medcations to help with his history of depression. 

Pt. vehemently rejects any rehab or support group for substance abuse issues 

even though highly recommended. Home meds are unknown as pt. does not know 

exact names or dosages. Pt. states he stopped using prescribed meds before 

going in to rehab. Pt. states he was upset when rehab did not give him a 

stronger med(BENZO) to help with his anxiety, "So I just left". 





Of note: UDS on admission POS for Cannabis, Cocaine





MENTAL STATUS EXAMINATION ON DISCHARGE:


Patient is a 31 year-old male who appears older than stated age. Pt. is dressed 

in his own clothes. Pt. is noted to have a steady gait.


Speech: Is pressured, circumstantial, of increased rate and normal volume. Pt. 

is articulate, coherent and spontaneous.


Language skills are intact.


Thought processes: Not clear, goal directed to go home only. Pt. bears no 

responsibility for his substance use, nor does he see a problem with it. 


Thought content: Irrational, illogical, tangential.


Abstract reasoning, and computation: Adequate.


Description of associations: Tangential, circumstantial.


Description of abnormal or psychotic thoughts: Pt. denies hallucinations, 

delusions, paranoia, obsessions, compulsions, homicidal or suicidal ideation. 

Pt. states he had been having false memories of dog bites and does not 

understand why. 


Judgment: Poor.   Insight: Minimal, if any.    Orientation to person, place, 

time and surroundings.  Recent and remote memory: "Clear" per pt. 


Attention span and concentration: Poor.  Language: Normal.      Fund of 

knowledge: Adequate.


Mood: "I am great"!      Affect: Appropriate, restricted, irrational at times 

in attempts to fabricate/confabulate, anxious, manic.





MEDICATIONS ON DISCHARGE:


Pt. refuses all meds or prescriptions usually given at  discharge





CONDITION ON DISCHARGE: Stable, no suicidal or homicidal ideation. 





PLAN/FOLLOWUP ARRANGEMENTS: Pt. is to be discharged home to maternal 

grandparents. Pt. to follow up with PCP upon discharge. Pt. to re-start therapy

, medication management services upon discharge, pt. to consider substance 

abuse rehab or support group upon discharge. Although these recommendations 

were made to patient, he flatly refused to even consider any. Pt. refuses any 

meds or appointment setup prior to discharge. Pt. refses any prescriptions to 

be written for discharge. 





The amount of time spent in the coordination of care for this patient was 

approximately 25 minutes.





Vital Signs





 Vital Sign - Last 24 Hours








 1/29/17 1/30/17





 18:00 06:46


 


Temp 97.6 96.8


 


Pulse 60 53


 


Resp 16 16


 


B/P 144/78 104/56











Allergies


Coded Allergies:  


     Bee Venom (Unverified  Allergy, Mild, 1/26/17)


     No Known Drug Allergy (Unverified  Allergy, Unknown, 1/26/17)








KERRY GOTTLIEB NP Jan 30, 2017 13:16

## 2018-12-07 ENCOUNTER — HOSPITAL ENCOUNTER (OUTPATIENT)
Dept: HOSPITAL 53 - M LRY | Age: 33
End: 2018-12-07
Attending: PHYSICIAN ASSISTANT
Payer: SELF-PAY

## 2018-12-07 DIAGNOSIS — M79.89: Primary | ICD-10-CM
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
## 2018-12-07 DIAGNOSIS — S62.031A: ICD-10-CM
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
## 2018-12-07 NOTE — REP
RIGHT HAND COMPLETE:  12/07/2018.

 

Clinical history:  Right hand, wrist, fell off ladder.  Swelling with decreased

range of motion hand and wrist.

 

Findings:  There is prominent soft tissue swelling about the third digit along

the middle and proximal phalanges.  I do not see a visible or displaced fracture,

avulsion or subluxation.  The IP, MCP joints show only minimal degenerative

change at the thumb MCP.  There is no evidence of fracture of the metacarpals.

Appears to be avulsion fracture at the dorsal and radiad aspect of the waist of

the scaphoid. This also a tiny ossific density adjacent to the greater

multangular bone could be old or new avulsion.  The carpal bones and the joint

spaces were intact.

 

Distal radius and ulna intact.

 

Impression:

 

1.  Appears to be an avulsion off the dorsal and radiad aspect of the scaphoid

and tiny ossific density adjacent to the peripheral margin of the greater

multangular bone.  Soft tissue swelling noted there. No other significant bony

finding.

 

2.  Swelling over the third digit along its proximal and middle phalanges without

visible avulsion or fracture.

 

 

Electronically Signed by

Shaan Yang MD 12/07/2018 04:50 P

## 2018-12-07 NOTE — REP
Right wrist:  Four views.

 

History:  Injury.

 

Findings:  Four views of the right wrist show soft tissue swelling dorsally over

the carpus.  There is a tiny chip fracture of the navicular bone visible on

oblique radiograph.  The opposite oblique shows a tiny bone fragment adjacent to

the greater multangular.  No other fracture is seen.

 

Impression:

 

Soft-tissue swelling about the carpus.  Chip fracture of the proximal pole of the

navicular bone.

 

 

Electronically Signed by

Sagar Morton MD 12/07/2018 02:33 P

## 2022-03-17 ENCOUNTER — HOSPITAL ENCOUNTER (OUTPATIENT)
Dept: HOSPITAL 53 - M OUTALCOH | Age: 37
End: 2022-03-17
Attending: PSYCHIATRY & NEUROLOGY
Payer: MEDICAID

## 2022-03-17 DIAGNOSIS — Z13.9: Primary | ICD-10-CM

## 2022-03-30 ENCOUNTER — HOSPITAL ENCOUNTER (OUTPATIENT)
Dept: HOSPITAL 53 - M OUTALCOH | Age: 37
LOS: 1 days | End: 2022-03-31
Attending: PSYCHIATRY & NEUROLOGY
Payer: MEDICAID

## 2022-03-30 DIAGNOSIS — F12.10: Primary | ICD-10-CM

## 2022-07-29 ENCOUNTER — HOSPITAL ENCOUNTER (OUTPATIENT)
Dept: HOSPITAL 53 - M OUTALCOH | Age: 37
End: 2022-07-29
Attending: PSYCHIATRY & NEUROLOGY
Payer: MEDICAID

## 2022-07-29 DIAGNOSIS — F15.10: ICD-10-CM

## 2022-07-29 DIAGNOSIS — F12.10: Primary | ICD-10-CM

## 2022-08-31 ENCOUNTER — HOSPITAL ENCOUNTER (OUTPATIENT)
Dept: HOSPITAL 53 - M OUTALCOH | Age: 37
End: 2022-08-31
Attending: PSYCHIATRY & NEUROLOGY
Payer: MEDICAID

## 2022-08-31 DIAGNOSIS — F15.10: ICD-10-CM

## 2022-08-31 DIAGNOSIS — F12.10: Primary | ICD-10-CM

## 2022-09-26 ENCOUNTER — HOSPITAL ENCOUNTER (OUTPATIENT)
Dept: HOSPITAL 53 - M OUTALCOH | Age: 37
LOS: 4 days | End: 2022-09-30
Attending: PSYCHIATRY & NEUROLOGY
Payer: MEDICAID

## 2022-09-26 DIAGNOSIS — F12.10: ICD-10-CM

## 2022-09-26 DIAGNOSIS — F15.10: Primary | ICD-10-CM

## 2022-10-26 ENCOUNTER — HOSPITAL ENCOUNTER (OUTPATIENT)
Dept: HOSPITAL 53 - M OUTALCOH | Age: 37
LOS: 5 days | End: 2022-10-31
Attending: PSYCHIATRY & NEUROLOGY
Payer: MEDICAID

## 2022-10-26 DIAGNOSIS — F15.10: ICD-10-CM

## 2022-10-26 DIAGNOSIS — F12.10: Primary | ICD-10-CM

## 2022-11-02 ENCOUNTER — HOSPITAL ENCOUNTER (OUTPATIENT)
Dept: HOSPITAL 53 - M OUTALCOH | Age: 37
LOS: 28 days | End: 2022-11-30
Attending: PSYCHIATRY & NEUROLOGY
Payer: MEDICAID

## 2022-11-02 DIAGNOSIS — F12.10: ICD-10-CM

## 2022-11-02 DIAGNOSIS — F15.10: Primary | ICD-10-CM

## 2022-11-30 ENCOUNTER — HOSPITAL ENCOUNTER (OUTPATIENT)
Dept: HOSPITAL 53 - M OUTALCOH | Age: 37
End: 2022-11-30
Attending: PSYCHIATRY & NEUROLOGY
Payer: MEDICAID

## 2022-11-30 DIAGNOSIS — F12.10: ICD-10-CM

## 2022-11-30 DIAGNOSIS — F15.10: Primary | ICD-10-CM

## 2022-12-21 ENCOUNTER — HOSPITAL ENCOUNTER (OUTPATIENT)
Dept: HOSPITAL 53 - M OUTALCOH | Age: 37
LOS: 10 days | End: 2022-12-31
Attending: PSYCHIATRY & NEUROLOGY
Payer: MEDICAID

## 2022-12-21 DIAGNOSIS — F15.10: ICD-10-CM

## 2022-12-21 DIAGNOSIS — F12.10: Primary | ICD-10-CM

## 2023-01-11 ENCOUNTER — HOSPITAL ENCOUNTER (OUTPATIENT)
Dept: HOSPITAL 53 - M OUTALCOH | Age: 38
LOS: 20 days | End: 2023-01-31
Attending: PSYCHIATRY & NEUROLOGY
Payer: MEDICAID

## 2023-01-11 DIAGNOSIS — F12.10: Primary | ICD-10-CM

## 2023-01-11 DIAGNOSIS — F15.10: ICD-10-CM

## 2023-02-02 ENCOUNTER — HOSPITAL ENCOUNTER (OUTPATIENT)
Dept: HOSPITAL 53 - M OUTALCOH | Age: 38
End: 2023-02-02
Attending: PSYCHIATRY & NEUROLOGY
Payer: MEDICAID

## 2023-02-02 DIAGNOSIS — Z02.9: Primary | ICD-10-CM

## 2023-05-04 ENCOUNTER — HOSPITAL ENCOUNTER (OUTPATIENT)
Dept: HOSPITAL 53 - M OUTALCOH | Age: 38
End: 2023-05-04
Attending: PSYCHIATRY & NEUROLOGY
Payer: MEDICAID

## 2023-05-04 DIAGNOSIS — F10.10: Primary | ICD-10-CM

## 2023-05-31 ENCOUNTER — HOSPITAL ENCOUNTER (OUTPATIENT)
Dept: HOSPITAL 53 - M OUTALCOH | Age: 38
End: 2023-05-31
Attending: PSYCHIATRY & NEUROLOGY
Payer: MEDICAID

## 2023-05-31 DIAGNOSIS — F15.20: Primary | ICD-10-CM

## 2023-05-31 DIAGNOSIS — F12.20: ICD-10-CM

## 2023-06-22 ENCOUNTER — HOSPITAL ENCOUNTER (OUTPATIENT)
Dept: HOSPITAL 53 - M OUTALCOH | Age: 38
LOS: 8 days | End: 2023-06-30
Attending: PSYCHIATRY & NEUROLOGY
Payer: MEDICAID

## 2023-06-22 DIAGNOSIS — F15.20: Primary | ICD-10-CM

## 2023-06-22 DIAGNOSIS — F12.20: ICD-10-CM

## 2025-07-23 ENCOUNTER — HOSPITAL ENCOUNTER (EMERGENCY)
Dept: HOSPITAL 53 - M ED | Age: 40
Discharge: LEFT BEFORE DISCHARGE | End: 2025-07-23
Payer: COMMERCIAL

## 2025-07-23 VITALS — WEIGHT: 185.39 LBS | BODY MASS INDEX: 23.05 KG/M2 | HEIGHT: 75 IN

## 2025-07-23 VITALS — OXYGEN SATURATION: 100 % | TEMPERATURE: 97.7 F | SYSTOLIC BLOOD PRESSURE: 136 MMHG | DIASTOLIC BLOOD PRESSURE: 95 MMHG

## 2025-07-23 DIAGNOSIS — Z53.21: Primary | ICD-10-CM
